# Patient Record
Sex: FEMALE | Race: BLACK OR AFRICAN AMERICAN | NOT HISPANIC OR LATINO | Employment: OTHER | URBAN - METROPOLITAN AREA
[De-identification: names, ages, dates, MRNs, and addresses within clinical notes are randomized per-mention and may not be internally consistent; named-entity substitution may affect disease eponyms.]

---

## 2017-11-16 ENCOUNTER — HISTORICAL (OUTPATIENT)
Dept: RADIOLOGY | Facility: HOSPITAL | Age: 62
End: 2017-11-16

## 2017-12-11 ENCOUNTER — HISTORICAL (OUTPATIENT)
Dept: RADIOLOGY | Facility: HOSPITAL | Age: 62
End: 2017-12-11

## 2019-05-08 ENCOUNTER — HISTORICAL (OUTPATIENT)
Dept: RADIOLOGY | Facility: HOSPITAL | Age: 64
End: 2019-05-08

## 2019-09-05 ENCOUNTER — HISTORICAL (OUTPATIENT)
Dept: RADIOLOGY | Facility: HOSPITAL | Age: 64
End: 2019-09-05

## 2020-05-04 ENCOUNTER — HISTORICAL (OUTPATIENT)
Dept: RADIOLOGY | Facility: HOSPITAL | Age: 65
End: 2020-05-04

## 2020-08-05 ENCOUNTER — HISTORICAL (OUTPATIENT)
Dept: RADIOLOGY | Facility: HOSPITAL | Age: 65
End: 2020-08-05

## 2021-01-08 ENCOUNTER — HISTORICAL (OUTPATIENT)
Dept: RADIOLOGY | Facility: HOSPITAL | Age: 66
End: 2021-01-08

## 2021-12-20 ENCOUNTER — HISTORICAL (OUTPATIENT)
Dept: ADMINISTRATIVE | Facility: HOSPITAL | Age: 66
End: 2021-12-20

## 2021-12-27 ENCOUNTER — HISTORICAL (OUTPATIENT)
Dept: ADMINISTRATIVE | Facility: HOSPITAL | Age: 66
End: 2021-12-27

## 2022-01-07 ENCOUNTER — HISTORICAL (OUTPATIENT)
Dept: CARDIOLOGY | Facility: HOSPITAL | Age: 67
End: 2022-01-07

## 2022-01-10 ENCOUNTER — HISTORICAL (OUTPATIENT)
Dept: ADMINISTRATIVE | Facility: HOSPITAL | Age: 67
End: 2022-01-10

## 2022-01-24 ENCOUNTER — HISTORICAL (OUTPATIENT)
Dept: ADMINISTRATIVE | Facility: HOSPITAL | Age: 67
End: 2022-01-24

## 2022-02-07 ENCOUNTER — HISTORICAL (OUTPATIENT)
Dept: ADMINISTRATIVE | Facility: HOSPITAL | Age: 67
End: 2022-02-07

## 2022-02-07 LAB — CBG: 135 (ref 70–115)

## 2022-02-21 ENCOUNTER — HISTORICAL (OUTPATIENT)
Dept: ADMINISTRATIVE | Facility: HOSPITAL | Age: 67
End: 2022-02-21

## 2022-02-21 LAB — CBG: 150 (ref 70–115)

## 2022-03-07 ENCOUNTER — HISTORICAL (OUTPATIENT)
Dept: ADMINISTRATIVE | Facility: HOSPITAL | Age: 67
End: 2022-03-07

## 2022-03-21 ENCOUNTER — HISTORICAL (OUTPATIENT)
Dept: ADMINISTRATIVE | Facility: HOSPITAL | Age: 67
End: 2022-03-21

## 2022-04-04 ENCOUNTER — HISTORICAL (OUTPATIENT)
Dept: ADMINISTRATIVE | Facility: HOSPITAL | Age: 67
End: 2022-04-04

## 2022-04-07 ENCOUNTER — HISTORICAL (OUTPATIENT)
Dept: ADMINISTRATIVE | Facility: HOSPITAL | Age: 67
End: 2022-04-07
Payer: MEDICAID

## 2022-04-18 ENCOUNTER — HISTORICAL (OUTPATIENT)
Dept: ADMINISTRATIVE | Facility: HOSPITAL | Age: 67
End: 2022-04-18
Payer: MEDICAID

## 2022-04-18 LAB — CBG: 145 (ref 70–115)

## 2022-04-24 VITALS
HEIGHT: 67 IN | BODY MASS INDEX: 26.85 KG/M2 | DIASTOLIC BLOOD PRESSURE: 82 MMHG | SYSTOLIC BLOOD PRESSURE: 119 MMHG | WEIGHT: 171.06 LBS | OXYGEN SATURATION: 96 %

## 2022-05-02 ENCOUNTER — OFFICE VISIT (OUTPATIENT)
Dept: WOUND CARE | Facility: HOSPITAL | Age: 67
End: 2022-05-02
Attending: NURSE PRACTITIONER
Payer: MEDICARE

## 2022-05-02 VITALS
SYSTOLIC BLOOD PRESSURE: 149 MMHG | HEART RATE: 86 BPM | DIASTOLIC BLOOD PRESSURE: 94 MMHG | BODY MASS INDEX: 27.97 KG/M2 | WEIGHT: 174 LBS | TEMPERATURE: 100 F | HEIGHT: 66 IN | RESPIRATION RATE: 18 BRPM

## 2022-05-02 DIAGNOSIS — L91.0 HYPERTROPHIC SCAR OF SKIN: ICD-10-CM

## 2022-05-02 DIAGNOSIS — L90.5 SCAR CONDITION AND FIBROSIS OF SKIN: Primary | ICD-10-CM

## 2022-05-02 DIAGNOSIS — E11.42 DIABETIC POLYNEUROPATHY ASSOCIATED WITH TYPE 2 DIABETES MELLITUS: ICD-10-CM

## 2022-05-02 DIAGNOSIS — I87.331 IDIOPATHIC CHRONIC VENOUS HTN OF RIGHT LEG WITH ULCER AND INFLAMMATION: ICD-10-CM

## 2022-05-02 DIAGNOSIS — L94.2 CALCINOSIS CUTIS: ICD-10-CM

## 2022-05-02 DIAGNOSIS — I10 BENIGN HYPERTENSION: ICD-10-CM

## 2022-05-02 PROCEDURE — 99213 OFFICE O/P EST LOW 20 MIN: CPT

## 2022-05-02 RX ORDER — GLUCOSAM/CHON-MSM1/C/MANG/BOSW 500-416.6
TABLET ORAL
COMMUNITY
Start: 2022-01-03

## 2022-05-02 RX ORDER — LOSARTAN POTASSIUM 25 MG/1
25 TABLET ORAL DAILY
COMMUNITY
Start: 2022-04-27

## 2022-05-02 RX ORDER — OMEPRAZOLE 40 MG/1
CAPSULE, DELAYED RELEASE ORAL
COMMUNITY
Start: 2022-04-28

## 2022-05-02 RX ORDER — ZIPRASIDONE HYDROCHLORIDE 40 MG/1
40 CAPSULE ORAL DAILY
COMMUNITY
Start: 2022-04-27

## 2022-05-02 RX ORDER — ZOLPIDEM TARTRATE 10 MG/1
10 TABLET ORAL NIGHTLY
COMMUNITY
Start: 2022-04-14

## 2022-05-02 RX ORDER — HYDROCHLOROTHIAZIDE 12.5 MG/1
12.5 CAPSULE ORAL DAILY
COMMUNITY
Start: 2021-12-01

## 2022-05-02 RX ORDER — LATANOPROST 50 UG/ML
SOLUTION/ DROPS OPHTHALMIC
COMMUNITY
Start: 2022-04-14

## 2022-05-02 RX ORDER — GLYBURIDE 5 MG/1
5 TABLET ORAL 2 TIMES DAILY
COMMUNITY
Start: 2022-04-28

## 2022-05-02 RX ORDER — METFORMIN HYDROCHLORIDE 500 MG/1
500 TABLET ORAL 2 TIMES DAILY
COMMUNITY
Start: 2022-04-28
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
## 2022-05-02 NOTE — PATIENT INSTRUCTIONS
Wound Care/ Self Care Instructions   Cleanse right leg and wound to right shin with a mild soap and water   Apply mupirocin ointment to wound bed   Cover with a bandaid   Wound care should be done every other day    Call [phone] if you have any questions or concerns

## 2022-05-16 ENCOUNTER — OFFICE VISIT (OUTPATIENT)
Dept: WOUND CARE | Facility: HOSPITAL | Age: 67
End: 2022-05-16
Attending: EMERGENCY MEDICINE
Payer: MEDICARE

## 2022-05-16 VITALS
TEMPERATURE: 98 F | SYSTOLIC BLOOD PRESSURE: 155 MMHG | RESPIRATION RATE: 16 BRPM | DIASTOLIC BLOOD PRESSURE: 95 MMHG | HEIGHT: 66 IN | HEART RATE: 84 BPM | BODY MASS INDEX: 27.97 KG/M2 | WEIGHT: 174 LBS

## 2022-05-16 DIAGNOSIS — L90.5 SCAR CONDITION AND FIBROSIS OF SKIN: ICD-10-CM

## 2022-05-16 DIAGNOSIS — I87.331 IDIOPATHIC CHRONIC VENOUS HTN OF RIGHT LEG WITH ULCER AND INFLAMMATION: Primary | ICD-10-CM

## 2022-05-16 DIAGNOSIS — L94.2 CALCINOSIS CUTIS: ICD-10-CM

## 2022-05-16 DIAGNOSIS — I10 BENIGN HYPERTENSION: ICD-10-CM

## 2022-05-16 DIAGNOSIS — L91.0 HYPERTROPHIC SCAR OF SKIN: ICD-10-CM

## 2022-05-16 DIAGNOSIS — E11.42 DIABETIC POLYNEUROPATHY ASSOCIATED WITH TYPE 2 DIABETES MELLITUS: ICD-10-CM

## 2022-05-16 LAB — POCT GLUCOSE: 67 MG/DL (ref 70–110)

## 2022-05-16 PROCEDURE — 99213 OFFICE O/P EST LOW 20 MIN: CPT | Mod: 27 | Performed by: NURSE PRACTITIONER

## 2022-05-16 PROCEDURE — 99214 OFFICE O/P EST MOD 30 MIN: CPT

## 2022-05-16 PROCEDURE — 99213 OFFICE O/P EST LOW 20 MIN: CPT | Mod: 27
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
## 2022-05-16 NOTE — PATIENT INSTRUCTIONS
Home health and self care DRESSING INSTRUCTIONS:        Wound location: Right Anterior Shin    Cover with bandaid for protection  No F/U required        Call our [facility] wound clinic for questions/concerns a [phone] .

## 2022-06-10 ENCOUNTER — HOSPITAL ENCOUNTER (OUTPATIENT)
Dept: RADIOLOGY | Facility: HOSPITAL | Age: 67
Discharge: HOME | End: 2022-06-10
Payer: MEDICARE

## 2022-06-10 DIAGNOSIS — M54.51 VERTEBROGENIC LOW BACK PAIN: ICD-10-CM

## 2022-06-10 PROCEDURE — 72100 X-RAY EXAM L-S SPINE 2/3 VWS: CPT | Mod: TC

## 2023-04-29 ENCOUNTER — HOSPITAL ENCOUNTER (EMERGENCY)
Facility: HOSPITAL | Age: 68
Discharge: HOME | End: 2023-04-30
Attending: EMERGENCY MEDICINE
Payer: MEDICARE

## 2023-04-29 VITALS
SYSTOLIC BLOOD PRESSURE: 161 MMHG | OXYGEN SATURATION: 100 % | HEIGHT: 66 IN | DIASTOLIC BLOOD PRESSURE: 90 MMHG | TEMPERATURE: 99 F | BODY MASS INDEX: 28.12 KG/M2 | WEIGHT: 175 LBS | RESPIRATION RATE: 18 BRPM | HEART RATE: 89 BPM

## 2023-04-29 DIAGNOSIS — I10 HYPERTENSION: ICD-10-CM

## 2023-04-29 DIAGNOSIS — I10 HYPERTENSION, UNSPECIFIED TYPE: Primary | ICD-10-CM

## 2023-04-29 LAB
ALBUMIN SERPL-MCNC: 4.2 G/DL (ref 3.4–4.8)
ALBUMIN/GLOB SERPL: 1 RATIO (ref 1.1–2)
ALP SERPL-CCNC: 116 UNIT/L (ref 40–150)
ALT SERPL-CCNC: 41 UNIT/L (ref 0–55)
AST SERPL-CCNC: 31 UNIT/L (ref 5–34)
BASOPHILS # BLD AUTO: 0.11 X10(3)/MCL (ref 0–0.2)
BASOPHILS NFR BLD AUTO: 1.2 %
BILIRUBIN DIRECT+TOT PNL SERPL-MCNC: 0.3 MG/DL
BUN SERPL-MCNC: 7.7 MG/DL (ref 9.8–20.1)
CALCIUM SERPL-MCNC: 10.4 MG/DL (ref 8.4–10.2)
CHLORIDE SERPL-SCNC: 105 MMOL/L (ref 98–107)
CO2 SERPL-SCNC: 23 MMOL/L (ref 23–31)
CREAT SERPL-MCNC: 0.73 MG/DL (ref 0.55–1.02)
EOSINOPHIL # BLD AUTO: 0.13 X10(3)/MCL (ref 0–0.9)
EOSINOPHIL NFR BLD AUTO: 1.4 %
ERYTHROCYTE [DISTWIDTH] IN BLOOD BY AUTOMATED COUNT: 12.7 % (ref 11.5–17)
GFR SERPLBLD CREATININE-BSD FMLA CKD-EPI: >60 MLS/MIN/1.73/M2
GLOBULIN SER-MCNC: 4.2 GM/DL (ref 2.4–3.5)
GLUCOSE SERPL-MCNC: 156 MG/DL (ref 82–115)
HCT VFR BLD AUTO: 45.4 % (ref 37–47)
HGB BLD-MCNC: 14.6 G/DL (ref 12–16)
IMM GRANULOCYTES # BLD AUTO: 0.02 X10(3)/MCL (ref 0–0.04)
IMM GRANULOCYTES NFR BLD AUTO: 0.2 %
LYMPHOCYTES # BLD AUTO: 4.86 X10(3)/MCL (ref 0.6–4.6)
LYMPHOCYTES NFR BLD AUTO: 53.8 %
MCH RBC QN AUTO: 27.3 PG (ref 27–31)
MCHC RBC AUTO-ENTMCNC: 32.2 G/DL (ref 33–36)
MCV RBC AUTO: 85 FL (ref 80–94)
MONOCYTES # BLD AUTO: 0.56 X10(3)/MCL (ref 0.1–1.3)
MONOCYTES NFR BLD AUTO: 6.2 %
NEUTROPHILS # BLD AUTO: 3.36 X10(3)/MCL (ref 2.1–9.2)
NEUTROPHILS NFR BLD AUTO: 37.2 %
NRBC BLD AUTO-RTO: 0 %
PLATELET # BLD AUTO: 264 X10(3)/MCL (ref 130–400)
PMV BLD AUTO: 10.8 FL (ref 7.4–10.4)
POTASSIUM SERPL-SCNC: 3.7 MMOL/L (ref 3.5–5.1)
PROT SERPL-MCNC: 8.4 GM/DL (ref 5.8–7.6)
RBC # BLD AUTO: 5.34 X10(6)/MCL (ref 4.2–5.4)
SODIUM SERPL-SCNC: 140 MMOL/L (ref 136–145)
TROPONIN I SERPL-MCNC: <0.01 NG/ML (ref 0–0.04)
WBC # SPEC AUTO: 9 X10(3)/MCL (ref 4.5–11.5)

## 2023-04-29 PROCEDURE — 84484 ASSAY OF TROPONIN QUANT: CPT | Performed by: NURSE PRACTITIONER

## 2023-04-29 PROCEDURE — 93005 ELECTROCARDIOGRAM TRACING: CPT

## 2023-04-29 PROCEDURE — 93010 ELECTROCARDIOGRAM REPORT: CPT | Mod: ,,, | Performed by: INTERNAL MEDICINE

## 2023-04-29 PROCEDURE — 99284 EMERGENCY DEPT VISIT MOD MDM: CPT

## 2023-04-29 PROCEDURE — 93010 EKG 12-LEAD: ICD-10-PCS | Mod: ,,, | Performed by: INTERNAL MEDICINE

## 2023-04-29 PROCEDURE — 85025 COMPLETE CBC W/AUTO DIFF WBC: CPT | Performed by: NURSE PRACTITIONER

## 2023-04-29 PROCEDURE — 80053 COMPREHEN METABOLIC PANEL: CPT | Performed by: NURSE PRACTITIONER

## 2023-04-29 RX ORDER — HYDRALAZINE HYDROCHLORIDE 25 MG/1
25 TABLET, FILM COATED ORAL 2 TIMES DAILY
COMMUNITY
Start: 2023-04-24 | End: 2024-03-14 | Stop reason: CLARIF

## 2023-04-30 NOTE — ED NOTES
Pt to er with c/o hypertension for a few days and right shoulder pain. Pt recently started on hydralazine by provider. Denies cp, sob or difficulty breathing. Alert and oriented. Denies trauma or injury to right shoulder. Rom intact with steady gait. Pt on cm. Awaiting labs. Spouse at side.

## 2023-04-30 NOTE — FIRST PROVIDER EVALUATION
Medical screening examination initiated.  I have conducted a focused provider triage encounter, findings are as follows:    Brief history of present illness:  Patient states that her BP has been elevated. States that she has been taking her BP medications. Denies any chest pain, SOB, dizziness, headache, or blurred vision.     There were no vitals filed for this visit.    Pertinent physical exam:  Awake, alert, ambulatory      Brief workup plan:  Labs    Preliminary workup initiated; this workup will be continued and followed by the physician or advanced practice provider that is assigned to the patient when roomed.

## 2023-04-30 NOTE — ED PROVIDER NOTES
Encounter Date: 4/29/2023       History     Chief Complaint   Patient presents with    Hypertension     Pt arrives c/o elevated BP at home - 183/119, second reading 200's SBP. Takes losartan, HCTZ, hydralazine at home. + R arm pain, R neck pain. Denies headache, blurred vision, dizziness, CP, SOB, weakness. PCP Dr. Juan Daniel Chung.      68 Patient presents with Hypertension: Pt arrives c/o elevated BP at home - 183/119, second reading 200's SBP. Takes losartan, HCTZ, hydralazine at home.   + R arm pain, R neck pain. Denies headache, blurred vision, dizziness, CP, SOB, weakness. PCP Dr. Juan Daniel Chung.  Patient took her hydralazine 25 mg prior to coming here to the ED.         Hypertension   This is a chronic problem. The problem has been waxing and waning. Pertinent negatives include no chest pain, no anxiety, no blurred vision, no headaches, no neck pain, no dizziness and no shortness of breath. There are no associated agents to hypertension. Risk factors include diabetes mellitus.   Review of patient's allergies indicates:   Allergen Reactions    Aspirin Hives     Past Medical History:   Diagnosis Date    Calcinosis cutis     DM (diabetes mellitus)     HTN (hypertension)      Past Surgical History:   Procedure Laterality Date    TUBAL LIGATION N/A      Family History   Problem Relation Age of Onset    Diabetes Mother     Hypertension Mother      Social History     Tobacco Use    Smoking status: Never    Smokeless tobacco: Never   Substance Use Topics    Alcohol use: Never     Review of Systems   Constitutional:  Negative for fever.   HENT:  Negative for sore throat.    Eyes:  Negative for blurred vision.   Respiratory:  Negative for shortness of breath.    Cardiovascular:  Negative for chest pain.   Gastrointestinal:  Negative for nausea.   Genitourinary:  Negative for dysuria.   Musculoskeletal:  Negative for back pain and neck pain.   Skin:  Negative for rash.   Neurological:  Negative for dizziness, weakness and  headaches.   Hematological:  Does not bruise/bleed easily.     Physical Exam     Initial Vitals [04/29/23 1955]   BP Pulse Resp Temp SpO2   (!) 185/91 98 18 99.3 °F (37.4 °C) 97 %      MAP       --         Physical Exam    Constitutional: She appears well-developed and well-nourished.   Cardiovascular:  Normal rate.           Pulmonary/Chest: Breath sounds normal. No respiratory distress. She has no wheezes. She has no rales.   Abdominal: Abdomen is soft.   Musculoskeletal:         General: Normal range of motion.     Neurological: She is alert and oriented to person, place, and time. She has normal strength.   Psychiatric: She has a normal mood and affect. Her behavior is normal. Judgment and thought content normal.       ED Course   Procedures  Labs Reviewed   COMPREHENSIVE METABOLIC PANEL - Abnormal; Notable for the following components:       Result Value    Glucose Level 156 (*)     Blood Urea Nitrogen 7.7 (*)     Calcium Level Total 10.4 (*)     Protein Total 8.4 (*)     Globulin 4.2 (*)     Albumin/Globulin Ratio 1.0 (*)     All other components within normal limits   CBC WITH DIFFERENTIAL - Abnormal; Notable for the following components:    MCHC 32.2 (*)     MPV 10.8 (*)     Lymph # 4.86 (*)     All other components within normal limits   TROPONIN I - Normal   CBC W/ AUTO DIFFERENTIAL    Narrative:     The following orders were created for panel order CBC Auto Differential.  Procedure                               Abnormality         Status                     ---------                               -----------         ------                     CBC with Differential[[id number]]        Abnormal            Final result                 Please view results for these tests on the individual orders.     EKG Readings: (Independently Interpreted)   Rhythm: Sinus Tachycardia. Heart Rate: 102. Conduction: Normal. ST Segments: Normal ST Segments. T Waves: Normal. Axis: Left Axis Deviation.     Imaging Results    None           Medications - No data to display  Medical Decision Making:   Initial Assessment:   68 Patient presents with Hypertension: Pt arrives c/o elevated BP at home - 183/119, second reading 200's SBP. Takes losartan, HCTZ, hydralazine at home.   + R arm pain, R neck pain. Denies headache, blurred vision, dizziness, CP, SOB, weakness. PCP Dr. Juan Daniel Chung.  Patient took her hydralazine 25 mg prior to coming here to the ED  Differential Diagnosis:   Judging by the patient's chief complaint and pertinent history, the patient has the following possible differential diagnoses, including but not limited to the following.  Some of these are deemed to be lower likelihood and some more likely based on my physical exam and history combined with possible lab work and/or imaging studies.   Please see the pertinent studies, and refer to the HPI.  Some of these diagnoses will take further evaluation to fully rule out, perhaps as an outpatient and the patient was encouraged to follow up when discharged for more comprehensive evaluation.    A, dehydration, medication induced, anxiety, panic disorder, caffeine/energy supplement side effect       Clinical Tests:   Lab Tests: Reviewed  The following lab test(s) were unremarkable: CBC, CMP and Troponin  ED Management:  CBC CMP troponin within normal range.  Stable pressure asymptomatic.  The patient is resting comfortably in no acute distress.  She is hemodynamically stable and is without objective evidence for acute process requiring urgent intervention or hospitalization. I provided counseling to patient with regard to condition, the treatment plan, specific conditions for return, and the importance of follow up. Detailed written and verbal instructions provided to patient and he expressed a verbal understanding of the discharge instructions and overall management plan. Reiterated the importance of medication administration and safety and advised patient to follow up with primary  care provider in 3-5 days or sooner if needed.  Answered questions at this time. The patient is stable for discharge.                 ED Course as of 04/30/23 0000   Sat Apr 29, 2023   8637 Patient feels better, she is currently not having any chest pain.  Asymptomatic at point. [YG]      ED Course User Index  [YG] ALEAH Szymanski                 Clinical Impression:   Final diagnoses:  [I10] Hypertension  [I10] Hypertension, unspecified type (Primary)        ED Disposition Condition    Discharge Stable          ED Prescriptions    None       Follow-up Information       Follow up With Specialties Details Why Contact Info    [facility] - Emergency Dept Emergency Medicine  If symptoms worsen [address] [facility] [id number] [phone]    Juan Daniel Chung MD Internal Medicine  If symptoms worsen [facility] [facility] [address]  [phone]               ALEAH Szymanski  04/30/23 0000

## 2023-07-12 ENCOUNTER — LAB VISIT (OUTPATIENT)
Dept: LAB | Facility: HOSPITAL | Age: 68
End: 2023-07-12
Attending: INTERNAL MEDICINE
Payer: MEDICARE

## 2023-07-12 DIAGNOSIS — K21.9 GASTROESOPHAGEAL REFLUX DISEASE, UNSPECIFIED WHETHER ESOPHAGITIS PRESENT: ICD-10-CM

## 2023-07-12 DIAGNOSIS — I10 ESSENTIAL HYPERTENSION, MALIGNANT: ICD-10-CM

## 2023-07-12 DIAGNOSIS — R53.81 MALAISE AND FATIGUE: ICD-10-CM

## 2023-07-12 DIAGNOSIS — R00.2 PALPITATIONS: ICD-10-CM

## 2023-07-12 DIAGNOSIS — R53.83 MALAISE AND FATIGUE: ICD-10-CM

## 2023-07-12 DIAGNOSIS — I20.89 ATYPICAL ANGINA: Primary | ICD-10-CM

## 2023-07-12 DIAGNOSIS — R94.39 ABNORMAL BALLISTOCARDIOGRAM: ICD-10-CM

## 2023-07-12 DIAGNOSIS — R06.09 DOE (DYSPNEA ON EXERTION): ICD-10-CM

## 2023-07-12 LAB
ALBUMIN SERPL-MCNC: 3.9 G/DL (ref 3.4–4.8)
ALBUMIN/GLOB SERPL: 1.1 RATIO (ref 1.1–2)
ALP SERPL-CCNC: 86 UNIT/L (ref 40–150)
ALT SERPL-CCNC: 34 UNIT/L (ref 0–55)
AST SERPL-CCNC: 23 UNIT/L (ref 5–34)
BASOPHILS # BLD AUTO: 0.09 X10(3)/MCL
BASOPHILS NFR BLD AUTO: 1.4 %
BILIRUBIN DIRECT+TOT PNL SERPL-MCNC: 0.3 MG/DL
BUN SERPL-MCNC: 9.4 MG/DL (ref 9.8–20.1)
CALCIUM SERPL-MCNC: 9.4 MG/DL (ref 8.4–10.2)
CHLORIDE SERPL-SCNC: 107 MMOL/L (ref 98–107)
CO2 SERPL-SCNC: 22 MMOL/L (ref 23–31)
CREAT SERPL-MCNC: 0.78 MG/DL (ref 0.55–1.02)
EOSINOPHIL # BLD AUTO: 0.1 X10(3)/MCL (ref 0–0.9)
EOSINOPHIL NFR BLD AUTO: 1.6 %
ERYTHROCYTE [DISTWIDTH] IN BLOOD BY AUTOMATED COUNT: 12.8 % (ref 11.5–17)
GFR SERPLBLD CREATININE-BSD FMLA CKD-EPI: >60 MLS/MIN/1.73/M2
GLOBULIN SER-MCNC: 3.7 GM/DL (ref 2.4–3.5)
GLUCOSE SERPL-MCNC: 191 MG/DL (ref 82–115)
HCT VFR BLD AUTO: 43.4 % (ref 37–47)
HGB BLD-MCNC: 14.1 G/DL (ref 12–16)
IMM GRANULOCYTES # BLD AUTO: 0.02 X10(3)/MCL (ref 0–0.04)
IMM GRANULOCYTES NFR BLD AUTO: 0.3 %
LYMPHOCYTES # BLD AUTO: 3.62 X10(3)/MCL (ref 0.6–4.6)
LYMPHOCYTES NFR BLD AUTO: 56.1 %
MAGNESIUM SERPL-MCNC: 1.9 MG/DL (ref 1.6–2.6)
MCH RBC QN AUTO: 27.6 PG (ref 27–31)
MCHC RBC AUTO-ENTMCNC: 32.5 G/DL (ref 33–36)
MCV RBC AUTO: 85.1 FL (ref 80–94)
MONOCYTES # BLD AUTO: 0.48 X10(3)/MCL (ref 0.1–1.3)
MONOCYTES NFR BLD AUTO: 7.4 %
NEUTROPHILS # BLD AUTO: 2.14 X10(3)/MCL (ref 2.1–9.2)
NEUTROPHILS NFR BLD AUTO: 33.2 %
NRBC BLD AUTO-RTO: 0 %
PLATELET # BLD AUTO: 262 X10(3)/MCL (ref 130–400)
PMV BLD AUTO: 10.6 FL (ref 7.4–10.4)
POTASSIUM SERPL-SCNC: 4.3 MMOL/L (ref 3.5–5.1)
PROT SERPL-MCNC: 7.6 GM/DL (ref 5.8–7.6)
RBC # BLD AUTO: 5.1 X10(6)/MCL (ref 4.2–5.4)
SODIUM SERPL-SCNC: 140 MMOL/L (ref 136–145)
WBC # SPEC AUTO: 6.45 X10(3)/MCL (ref 4.5–11.5)

## 2023-07-12 PROCEDURE — 80053 COMPREHEN METABOLIC PANEL: CPT

## 2023-07-12 PROCEDURE — 36415 COLL VENOUS BLD VENIPUNCTURE: CPT

## 2023-07-12 PROCEDURE — 83735 ASSAY OF MAGNESIUM: CPT

## 2023-07-12 PROCEDURE — 85025 COMPLETE CBC W/AUTO DIFF WBC: CPT

## 2023-08-24 ENCOUNTER — APPOINTMENT (OUTPATIENT)
Dept: LAB | Facility: HOSPITAL | Age: 68
End: 2023-08-24
Attending: INTERNAL MEDICINE
Payer: MEDICARE

## 2023-08-24 DIAGNOSIS — R00.2 PALPITATIONS: ICD-10-CM

## 2023-08-24 DIAGNOSIS — I20.89 ANGINA DECUBITUS: Primary | ICD-10-CM

## 2023-08-24 DIAGNOSIS — R53.81 DEBILITY: ICD-10-CM

## 2023-08-24 DIAGNOSIS — K21.9 GASTROESOPHAGEAL REFLUX DISEASE, UNSPECIFIED WHETHER ESOPHAGITIS PRESENT: ICD-10-CM

## 2023-08-24 DIAGNOSIS — E11.9 DIABETES MELLITUS WITHOUT COMPLICATION: ICD-10-CM

## 2023-08-24 DIAGNOSIS — R06.02 SHORTNESS OF BREATH: ICD-10-CM

## 2023-08-24 DIAGNOSIS — R06.00 DYSPNEA, UNSPECIFIED TYPE: ICD-10-CM

## 2023-08-24 LAB
ALBUMIN SERPL-MCNC: 3.9 G/DL (ref 3.4–4.8)
ALBUMIN/GLOB SERPL: 1.2 RATIO (ref 1.1–2)
ALP SERPL-CCNC: 77 UNIT/L (ref 40–150)
ALT SERPL-CCNC: 39 UNIT/L (ref 0–55)
AST SERPL-CCNC: 33 UNIT/L (ref 5–34)
BASOPHILS # BLD AUTO: 0.09 X10(3)/MCL
BASOPHILS NFR BLD AUTO: 1.4 %
BILIRUB SERPL-MCNC: 0.4 MG/DL
BUN SERPL-MCNC: 9.3 MG/DL (ref 9.8–20.1)
CALCIUM SERPL-MCNC: 9.5 MG/DL (ref 8.4–10.2)
CHLORIDE SERPL-SCNC: 108 MMOL/L (ref 98–107)
CHOLEST SERPL-MCNC: 113 MG/DL
CHOLEST/HDLC SERPL: 3 {RATIO} (ref 0–5)
CO2 SERPL-SCNC: 24 MMOL/L (ref 23–31)
CREAT SERPL-MCNC: 0.75 MG/DL (ref 0.55–1.02)
DEPRECATED CALCIDIOL+CALCIFEROL SERPL-MC: 14.5 NG/ML (ref 30–80)
EOSINOPHIL # BLD AUTO: 0.06 X10(3)/MCL (ref 0–0.9)
EOSINOPHIL NFR BLD AUTO: 0.9 %
ERYTHROCYTE [DISTWIDTH] IN BLOOD BY AUTOMATED COUNT: 13 % (ref 11.5–17)
FT4I SERPL CALC-MCNC: 2.57 (ref 2.6–3.6)
GFR SERPLBLD CREATININE-BSD FMLA CKD-EPI: >60 MLS/MIN/1.73/M2
GLOBULIN SER-MCNC: 3.3 GM/DL (ref 2.4–3.5)
GLUCOSE SERPL-MCNC: 183 MG/DL (ref 82–115)
HCT VFR BLD AUTO: 43 % (ref 37–47)
HDLC SERPL-MCNC: 42 MG/DL (ref 35–60)
HGB BLD-MCNC: 13.6 G/DL (ref 12–16)
IMM GRANULOCYTES # BLD AUTO: 0.02 X10(3)/MCL (ref 0–0.04)
IMM GRANULOCYTES NFR BLD AUTO: 0.3 %
LDLC SERPL CALC-MCNC: 50 MG/DL (ref 50–140)
LYMPHOCYTES # BLD AUTO: 3.59 X10(3)/MCL (ref 0.6–4.6)
LYMPHOCYTES NFR BLD AUTO: 54.6 %
MAGNESIUM SERPL-MCNC: 2 MG/DL (ref 1.6–2.6)
MCH RBC QN AUTO: 27.6 PG (ref 27–31)
MCHC RBC AUTO-ENTMCNC: 31.6 G/DL (ref 33–36)
MCV RBC AUTO: 87.2 FL (ref 80–94)
MONOCYTES # BLD AUTO: 0.46 X10(3)/MCL (ref 0.1–1.3)
MONOCYTES NFR BLD AUTO: 7 %
NEUTROPHILS # BLD AUTO: 2.36 X10(3)/MCL (ref 2.1–9.2)
NEUTROPHILS NFR BLD AUTO: 35.8 %
NRBC BLD AUTO-RTO: 0 %
PLATELET # BLD AUTO: 228 X10(3)/MCL (ref 130–400)
PMV BLD AUTO: 10.8 FL (ref 7.4–10.4)
POTASSIUM SERPL-SCNC: 4 MMOL/L (ref 3.5–5.1)
PROT SERPL-MCNC: 7.2 GM/DL (ref 5.8–7.6)
RBC # BLD AUTO: 4.93 X10(6)/MCL (ref 4.2–5.4)
SODIUM SERPL-SCNC: 139 MMOL/L (ref 136–145)
T3RU NFR SERPL: 21.15 % (ref 31–39)
T4 SERPL-MCNC: 12.17 UG/DL (ref 4.87–11.72)
TRIGL SERPL-MCNC: 105 MG/DL (ref 37–140)
TSH SERPL-ACNC: 0.77 UIU/ML (ref 0.35–4.94)
VLDLC SERPL CALC-MCNC: 21 MG/DL
WBC # SPEC AUTO: 6.58 X10(3)/MCL (ref 4.5–11.5)

## 2023-08-24 PROCEDURE — 84436 ASSAY OF TOTAL THYROXINE: CPT

## 2023-08-24 PROCEDURE — 82306 VITAMIN D 25 HYDROXY: CPT | Mod: GA

## 2023-08-24 PROCEDURE — 84443 ASSAY THYROID STIM HORMONE: CPT

## 2023-08-24 PROCEDURE — 80053 COMPREHEN METABOLIC PANEL: CPT

## 2023-08-24 PROCEDURE — 80061 LIPID PANEL: CPT

## 2023-08-24 PROCEDURE — 83735 ASSAY OF MAGNESIUM: CPT

## 2023-08-24 PROCEDURE — 36415 COLL VENOUS BLD VENIPUNCTURE: CPT

## 2023-08-24 PROCEDURE — 85025 COMPLETE CBC W/AUTO DIFF WBC: CPT

## 2024-02-22 ENCOUNTER — APPOINTMENT (OUTPATIENT)
Dept: LAB | Facility: HOSPITAL | Age: 69
End: 2024-02-22
Attending: INTERNAL MEDICINE
Payer: MEDICARE

## 2024-02-22 DIAGNOSIS — I10 ESSENTIAL HYPERTENSION, MALIGNANT: Primary | ICD-10-CM

## 2024-02-22 DIAGNOSIS — E55.9 AVITAMINOSIS D: ICD-10-CM

## 2024-02-22 DIAGNOSIS — E78.00 PURE HYPERCHOLESTEROLEMIA: ICD-10-CM

## 2024-02-22 DIAGNOSIS — E11.9 DIABETES MELLITUS WITHOUT COMPLICATION: ICD-10-CM

## 2024-02-22 LAB
ALBUMIN SERPL-MCNC: 3.9 G/DL (ref 3.4–4.8)
ALBUMIN/GLOB SERPL: 1 RATIO (ref 1.1–2)
ALP SERPL-CCNC: 87 UNIT/L (ref 40–150)
ALT SERPL-CCNC: 34 UNIT/L (ref 0–55)
AST SERPL-CCNC: 30 UNIT/L (ref 5–34)
BILIRUB SERPL-MCNC: 0.5 MG/DL
BUN SERPL-MCNC: 9 MG/DL (ref 9.8–20.1)
CALCIUM SERPL-MCNC: 9.7 MG/DL (ref 8.4–10.2)
CHLORIDE SERPL-SCNC: 108 MMOL/L (ref 98–107)
CHOLEST SERPL-MCNC: 119 MG/DL
CHOLEST/HDLC SERPL: 3 {RATIO} (ref 0–5)
CO2 SERPL-SCNC: 26 MMOL/L (ref 23–31)
CREAT SERPL-MCNC: 0.78 MG/DL (ref 0.55–1.02)
DEPRECATED CALCIDIOL+CALCIFEROL SERPL-MC: 8.2 NG/ML (ref 30–80)
ERYTHROCYTE [DISTWIDTH] IN BLOOD BY AUTOMATED COUNT: 12.9 % (ref 11.5–17)
EST. AVERAGE GLUCOSE BLD GHB EST-MCNC: 208.7 MG/DL
GFR SERPLBLD CREATININE-BSD FMLA CKD-EPI: >60 MLS/MIN/1.73/M2
GLOBULIN SER-MCNC: 3.9 GM/DL (ref 2.4–3.5)
GLUCOSE SERPL-MCNC: 174 MG/DL (ref 82–115)
HBA1C MFR BLD: 8.9 %
HCT VFR BLD AUTO: 47.7 % (ref 37–47)
HDLC SERPL-MCNC: 36 MG/DL (ref 35–60)
HGB BLD-MCNC: 14.2 G/DL (ref 12–16)
LDLC SERPL CALC-MCNC: 48 MG/DL (ref 50–140)
MCH RBC QN AUTO: 27.2 PG (ref 27–31)
MCHC RBC AUTO-ENTMCNC: 29.8 G/DL (ref 33–36)
MCV RBC AUTO: 91.4 FL (ref 80–94)
NRBC BLD AUTO-RTO: 0 %
PLATELET # BLD AUTO: 232 X10(3)/MCL (ref 130–400)
PMV BLD AUTO: 10.6 FL (ref 7.4–10.4)
POTASSIUM SERPL-SCNC: 4.5 MMOL/L (ref 3.5–5.1)
PROT SERPL-MCNC: 7.8 GM/DL (ref 5.8–7.6)
RBC # BLD AUTO: 5.22 X10(6)/MCL (ref 4.2–5.4)
SODIUM SERPL-SCNC: 142 MMOL/L (ref 136–145)
T4 FREE SERPL-MCNC: 0.78 NG/DL (ref 0.7–1.48)
TRIGL SERPL-MCNC: 173 MG/DL (ref 37–140)
TSH SERPL-ACNC: 0.69 UIU/ML (ref 0.35–4.94)
VLDLC SERPL CALC-MCNC: 35 MG/DL
WBC # SPEC AUTO: 6.81 X10(3)/MCL (ref 4.5–11.5)

## 2024-02-22 PROCEDURE — 84443 ASSAY THYROID STIM HORMONE: CPT

## 2024-02-22 PROCEDURE — 80061 LIPID PANEL: CPT

## 2024-02-22 PROCEDURE — 82306 VITAMIN D 25 HYDROXY: CPT

## 2024-02-22 PROCEDURE — 85027 COMPLETE CBC AUTOMATED: CPT

## 2024-02-22 PROCEDURE — 83036 HEMOGLOBIN GLYCOSYLATED A1C: CPT

## 2024-02-22 PROCEDURE — 36415 COLL VENOUS BLD VENIPUNCTURE: CPT

## 2024-02-22 PROCEDURE — 80053 COMPREHEN METABOLIC PANEL: CPT

## 2024-02-22 PROCEDURE — 84439 ASSAY OF FREE THYROXINE: CPT

## 2024-03-14 ENCOUNTER — HOSPITAL ENCOUNTER (OUTPATIENT)
Dept: WOUND CARE | Facility: HOSPITAL | Age: 69
Discharge: HOME | End: 2024-03-14
Attending: EMERGENCY MEDICINE | Admitting: EMERGENCY MEDICINE
Payer: MEDICARE

## 2024-03-14 VITALS
HEIGHT: 66 IN | DIASTOLIC BLOOD PRESSURE: 90 MMHG | BODY MASS INDEX: 27.32 KG/M2 | HEART RATE: 77 BPM | SYSTOLIC BLOOD PRESSURE: 167 MMHG | WEIGHT: 170 LBS | RESPIRATION RATE: 20 BRPM | TEMPERATURE: 98 F

## 2024-03-14 DIAGNOSIS — F31.9 BIPOLAR AFFECTIVE DISORDER, REMISSION STATUS UNSPECIFIED: ICD-10-CM

## 2024-03-14 DIAGNOSIS — E11.42 DIABETIC POLYNEUROPATHY ASSOCIATED WITH TYPE 2 DIABETES MELLITUS: ICD-10-CM

## 2024-03-14 DIAGNOSIS — I10 BENIGN HYPERTENSION: ICD-10-CM

## 2024-03-14 DIAGNOSIS — S81.801A OPEN WOUND OF RIGHT LOWER LEG, INITIAL ENCOUNTER: ICD-10-CM

## 2024-03-14 DIAGNOSIS — L94.2 CALCINOSIS CUTIS: ICD-10-CM

## 2024-03-14 DIAGNOSIS — I87.331 IDIOPATHIC CHRONIC VENOUS HTN OF RIGHT LEG WITH ULCER AND INFLAMMATION: ICD-10-CM

## 2024-03-14 DIAGNOSIS — E78.5 HYPERLIPIDEMIA, UNSPECIFIED HYPERLIPIDEMIA TYPE: ICD-10-CM

## 2024-03-14 DIAGNOSIS — L91.0 HYPERTROPHIC SCAR OF SKIN: ICD-10-CM

## 2024-03-14 DIAGNOSIS — L90.5 SCAR CONDITION AND FIBROSIS OF SKIN: ICD-10-CM

## 2024-03-14 LAB — POCT GLUCOSE: 174 MG/DL (ref 70–110)

## 2024-03-14 PROCEDURE — 99215 OFFICE O/P EST HI 40 MIN: CPT

## 2024-03-14 PROCEDURE — 99215 OFFICE O/P EST HI 40 MIN: CPT | Mod: ,,, | Performed by: EMERGENCY MEDICINE

## 2024-03-14 PROCEDURE — 27000999 HC MEDICAL RECORD PHOTO DOCUMENTATION

## 2024-03-14 RX ORDER — NEBIVOLOL 10 MG/1
10 TABLET ORAL
COMMUNITY
Start: 2024-02-16

## 2024-03-14 RX ORDER — ERGOCALCIFEROL 1.25 1/1
50000 CAPSULE ORAL
COMMUNITY
Start: 2024-02-27

## 2024-03-14 NOTE — PATIENT INSTRUCTIONS
Pt seen today by: Dr. Thais Wylie      Home health and self care DRESSING INSTRUCTIONS:        Wound location: right lower leg    Cleanse wound with wound cleanser or saline or baby shampoo and water  Apply calcium alginate to the wound bed  Cover with bandaid   Change dressing daily     Compression with: N/A    Return visit: Thursday, March 21, 2024 at 10:00 am      Wound may have been debrided in clinic: if so, WHAT YOU NEED TO KNOW:    Debridement is the removal of infected, damaged, or dead tissue so a wound can heal properly. Your wound may need more than one debridement. Debridement can cause bleeding, and a small amount of blood is expected.  AFTER A DEBRIDEMENT:    Keep your wound clean and dry. Do not remove the dressing unless instructed.  Follow the wound care orders provided to you or your home health care provider.  If you have pain, take over the counter pain relievers or pain medication if prescribed.  Elevate the wound and limit excessive activity to prevent bleeding and/or swelling in your wound.  If you see blood coming through the dressing, apply gauze and tape over the dressing and hold firm pressure to the wound with your hand for 5-10 minutes continuously, without peeking, to help the bleeding stop.  Contact [facility] wound care team at [phone] or go to the nearest Emergency department if:    You have a fever greater than 101 taken by mouth.  Your pain gets worse or does not go away, even after taking your regular pain medicine.  Your skin around your wound is red, hot, swollen, or draining pus.  You have bleeding that continues to come through the dressing after holding pressure for 10 minutes       Compression: You may be using a compressive type of dressings to control edema: If so, keep your compression wrap or tubigrip in place. Do not get them wet, they should feel snug. If they feel tight, or cause pain in any way,  elevate your legs above your heart for 15 minutes. If the wraps  still cause pain or you can not tolerate compression,  please remove and notify the clinic or your home health.     Nutrition:  The current daily value (%DV) for protein is 50 grams per day and is meant as a general goal for most people. Further increasing your dietary protein intake is very important for wound healing. Typically one needs over 100g of protein per day to help with wound healing needs.  If you are a dialysis patient or have problems with your kidneys, talk to your Nephrologist about how much protein you can take in with your condition.  Examples of high protein items that can be added to your diet include: eggs, chicken, red meats, almonds, cottage cheese, Greek yogurt, beans, and peanut butter.  Fortified protein bars, shakes and drinks can add 15-30 additional grams of protein per serving.   Also add:   1 daily general multivitamin   Jomar : 1 packet twice daily   Vitamin C : 500mg twice daily   Zinc 220 mg daily  Vit D : once daily    Offloading   Offload your wound. This means to reduce pressure on and around the wound that reduces blood flow to the wound and prevents healing. Your wound care team will discuss specific ways for you to offload your specific wound. Common offloading strategies include:    Turn or reposition every 2 hours or sooner  Use pillows, wedges, ROHO wheelchair cushions or other special devices like boots and shoes to lift the wound off of hard surfaces  Alternating Low Air-loss (ALAL) mattress may be ordered  Padded dressings can reduce wound pressure        Call our [facility] wound clinic for questions/concerns a [phone]
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
## 2024-03-21 ENCOUNTER — HOSPITAL ENCOUNTER (OUTPATIENT)
Dept: WOUND CARE | Facility: HOSPITAL | Age: 69
Discharge: HOME | End: 2024-03-21
Attending: EMERGENCY MEDICINE
Payer: MEDICARE

## 2024-03-21 VITALS
RESPIRATION RATE: 18 BRPM | BODY MASS INDEX: 27.32 KG/M2 | DIASTOLIC BLOOD PRESSURE: 84 MMHG | HEIGHT: 66 IN | HEART RATE: 89 BPM | SYSTOLIC BLOOD PRESSURE: 157 MMHG | WEIGHT: 170 LBS | TEMPERATURE: 98 F

## 2024-03-21 DIAGNOSIS — E78.5 HYPERLIPIDEMIA, UNSPECIFIED HYPERLIPIDEMIA TYPE: ICD-10-CM

## 2024-03-21 DIAGNOSIS — F31.9 BIPOLAR AFFECTIVE DISORDER, REMISSION STATUS UNSPECIFIED: ICD-10-CM

## 2024-03-21 DIAGNOSIS — L94.2 CALCINOSIS CUTIS: ICD-10-CM

## 2024-03-21 DIAGNOSIS — L91.0 HYPERTROPHIC SCAR OF SKIN: ICD-10-CM

## 2024-03-21 DIAGNOSIS — E11.42 DIABETIC POLYNEUROPATHY ASSOCIATED WITH TYPE 2 DIABETES MELLITUS: ICD-10-CM

## 2024-03-21 DIAGNOSIS — L90.5 SCAR CONDITION AND FIBROSIS OF SKIN: ICD-10-CM

## 2024-03-21 DIAGNOSIS — I10 BENIGN HYPERTENSION: ICD-10-CM

## 2024-03-21 DIAGNOSIS — I87.331 IDIOPATHIC CHRONIC VENOUS HTN OF RIGHT LEG WITH ULCER AND INFLAMMATION: ICD-10-CM

## 2024-03-21 DIAGNOSIS — S81.801A OPEN WOUND OF RIGHT LOWER LEG, INITIAL ENCOUNTER: Primary | ICD-10-CM

## 2024-03-21 LAB — POCT GLUCOSE: 231 MG/DL (ref 70–110)

## 2024-03-21 PROCEDURE — 27000999 HC MEDICAL RECORD PHOTO DOCUMENTATION

## 2024-03-21 PROCEDURE — 99214 OFFICE O/P EST MOD 30 MIN: CPT

## 2024-03-21 PROCEDURE — 99214 OFFICE O/P EST MOD 30 MIN: CPT | Mod: ,,, | Performed by: EMERGENCY MEDICINE

## 2024-03-21 RX ORDER — SULFAMETHOXAZOLE AND TRIMETHOPRIM 400; 80 MG/1; MG/1
1 TABLET ORAL EVERY 12 HOURS
Qty: 14 TABLET | Refills: 0 | Status: SHIPPED | OUTPATIENT
Start: 2024-03-21 | End: 2024-03-28

## 2024-03-21 NOTE — PATIENT INSTRUCTIONS
Pt seen today by: Dr. Thais Wylie      We will call you in some antibiotics (for 14 days) into [facility], pick them up and began taking      Home health and self care DRESSING INSTRUCTIONS:        Wound location: right lower leg    Cleanse wound with wound cleanser or saline or baby shampoo and water  Cover both wounds with calcium alginate   Cover with bandaid   Change dressing daily     Compression with: N/A    Return visit: Thursday, March 28, 2024 at 10:00 am      Wound may have been debrided in clinic: if so, WHAT YOU NEED TO KNOW:    Debridement is the removal of infected, damaged, or dead tissue so a wound can heal properly. Your wound may need more than one debridement. Debridement can cause bleeding, and a small amount of blood is expected.  AFTER A DEBRIDEMENT:    Keep your wound clean and dry. Do not remove the dressing unless instructed.  Follow the wound care orders provided to you or your home health care provider.  If you have pain, take over the counter pain relievers or pain medication if prescribed.  Elevate the wound and limit excessive activity to prevent bleeding and/or swelling in your wound.  If you see blood coming through the dressing, apply gauze and tape over the dressing and hold firm pressure to the wound with your hand for 5-10 minutes continuously, without peeking, to help the bleeding stop.  Contact [facility] wound care team at [phone] or go to the nearest Emergency department if:    You have a fever greater than 101 taken by mouth.  Your pain gets worse or does not go away, even after taking your regular pain medicine.  Your skin around your wound is red, hot, swollen, or draining pus.  You have bleeding that continues to come through the dressing after holding pressure for 10 minutes       Compression: You may be using a compressive type of dressings to control edema: If so, keep your compression wrap or tubigrip in place. Do not get them wet, they should feel snug. If  they feel tight, or cause pain in any way,  elevate your legs above your heart for 15 minutes. If the wraps still cause pain or you can not tolerate compression,  please remove and notify the clinic or your home health.     Nutrition:  The current daily value (%DV) for protein is 50 grams per day and is meant as a general goal for most people. Further increasing your dietary protein intake is very important for wound healing. Typically one needs over 100g of protein per day to help with wound healing needs.  If you are a dialysis patient or have problems with your kidneys, talk to your Nephrologist about how much protein you can take in with your condition.  Examples of high protein items that can be added to your diet include: eggs, chicken, red meats, almonds, cottage cheese, Greek yogurt, beans, and peanut butter.  Fortified protein bars, shakes and drinks can add 15-30 additional grams of protein per serving.   Also add:   1 daily general multivitamin   Jomar : 1 packet twice daily   Vitamin C : 500mg twice daily   Zinc 220 mg daily  Vit D : once daily    Offloading   Offload your wound. This means to reduce pressure on and around the wound that reduces blood flow to the wound and prevents healing. Your wound care team will discuss specific ways for you to offload your specific wound. Common offloading strategies include:    Turn or reposition every 2 hours or sooner  Use pillows, wedges, ROHO wheelchair cushions or other special devices like boots and shoes to lift the wound off of hard surfaces  Alternating Low Air-loss (ALAL) mattress may be ordered  Padded dressings can reduce wound pressure        Call our [facility] wound clinic for questions/concerns a [phone]

## 2024-03-28 ENCOUNTER — HOSPITAL ENCOUNTER (OUTPATIENT)
Dept: WOUND CARE | Facility: HOSPITAL | Age: 69
Discharge: HOME | End: 2024-03-28
Attending: EMERGENCY MEDICINE
Payer: MEDICARE

## 2024-03-28 VITALS
RESPIRATION RATE: 18 BRPM | WEIGHT: 170 LBS | HEART RATE: 71 BPM | TEMPERATURE: 98 F | HEIGHT: 66 IN | SYSTOLIC BLOOD PRESSURE: 119 MMHG | BODY MASS INDEX: 27.32 KG/M2 | DIASTOLIC BLOOD PRESSURE: 74 MMHG

## 2024-03-28 DIAGNOSIS — E11.42 DIABETIC POLYNEUROPATHY ASSOCIATED WITH TYPE 2 DIABETES MELLITUS: ICD-10-CM

## 2024-03-28 DIAGNOSIS — F31.9 BIPOLAR AFFECTIVE DISORDER, REMISSION STATUS UNSPECIFIED: ICD-10-CM

## 2024-03-28 DIAGNOSIS — I87.331 IDIOPATHIC CHRONIC VENOUS HTN OF RIGHT LEG WITH ULCER AND INFLAMMATION: ICD-10-CM

## 2024-03-28 DIAGNOSIS — L90.5 SCAR CONDITION AND FIBROSIS OF SKIN: ICD-10-CM

## 2024-03-28 DIAGNOSIS — L91.0 HYPERTROPHIC SCAR OF SKIN: ICD-10-CM

## 2024-03-28 DIAGNOSIS — L94.2 CALCINOSIS CUTIS: ICD-10-CM

## 2024-03-28 DIAGNOSIS — I10 BENIGN HYPERTENSION: ICD-10-CM

## 2024-03-28 DIAGNOSIS — S81.801A OPEN WOUND OF RIGHT LOWER LEG, INITIAL ENCOUNTER: Primary | ICD-10-CM

## 2024-03-28 LAB — POCT GLUCOSE: 176 MG/DL (ref 70–110)

## 2024-03-28 PROCEDURE — 99213 OFFICE O/P EST LOW 20 MIN: CPT

## 2024-03-28 PROCEDURE — 99213 OFFICE O/P EST LOW 20 MIN: CPT | Mod: ,,, | Performed by: EMERGENCY MEDICINE

## 2024-03-28 PROCEDURE — 27000999 HC MEDICAL RECORD PHOTO DOCUMENTATION

## 2024-03-28 RX ORDER — MUPIROCIN 20 MG/G
OINTMENT TOPICAL DAILY
Qty: 15 G | Refills: 0 | Status: SHIPPED | OUTPATIENT
Start: 2024-03-28 | End: 2024-04-07

## 2024-03-28 NOTE — PATIENT INSTRUCTIONS
Pt seen today by: Dr. Thais Wylie      We will call you in some ointment into [facility], pick it up and start using      Self care DRESSING INSTRUCTIONS:        Wound location: right lower leg    Cleanse wound with wound cleanser or saline or baby shampoo and water  Apply ointment into both wound beds  Cover with bandaid   Change dressing daily     Compression with: N/A    Return visit: Thursday, April 4, 2024 at 10:00 am      Wound may have been debrided in clinic: if so, WHAT YOU NEED TO KNOW:    Debridement is the removal of infected, damaged, or dead tissue so a wound can heal properly. Your wound may need more than one debridement. Debridement can cause bleeding, and a small amount of blood is expected.  AFTER A DEBRIDEMENT:    Keep your wound clean and dry. Do not remove the dressing unless instructed.  Follow the wound care orders provided to you or your home health care provider.  If you have pain, take over the counter pain relievers or pain medication if prescribed.  Elevate the wound and limit excessive activity to prevent bleeding and/or swelling in your wound.  If you see blood coming through the dressing, apply gauze and tape over the dressing and hold firm pressure to the wound with your hand for 5-10 minutes continuously, without peeking, to help the bleeding stop.  Contact [facility] wound care team at [phone] or go to the nearest Emergency department if:    You have a fever greater than 101 taken by mouth.  Your pain gets worse or does not go away, even after taking your regular pain medicine.  Your skin around your wound is red, hot, swollen, or draining pus.  You have bleeding that continues to come through the dressing after holding pressure for 10 minutes       Compression: You may be using a compressive type of dressings to control edema: If so, keep your compression wrap or tubigrip in place. Do not get them wet, they should feel snug. If they feel tight, or cause pain in any way,   elevate your legs above your heart for 15 minutes. If the wraps still cause pain or you can not tolerate compression,  please remove and notify the clinic or your home health.     Nutrition:  The current daily value (%DV) for protein is 50 grams per day and is meant as a general goal for most people. Further increasing your dietary protein intake is very important for wound healing. Typically one needs over 100g of protein per day to help with wound healing needs.  If you are a dialysis patient or have problems with your kidneys, talk to your Nephrologist about how much protein you can take in with your condition.  Examples of high protein items that can be added to your diet include: eggs, chicken, red meats, almonds, cottage cheese, Greek yogurt, beans, and peanut butter.  Fortified protein bars, shakes and drinks can add 15-30 additional grams of protein per serving.   Also add:   1 daily general multivitamin   Jomar : 1 packet twice daily   Vitamin C : 500mg twice daily   Zinc 220 mg daily  Vit D : once daily    Offloading   Offload your wound. This means to reduce pressure on and around the wound that reduces blood flow to the wound and prevents healing. Your wound care team will discuss specific ways for you to offload your specific wound. Common offloading strategies include:    Turn or reposition every 2 hours or sooner  Use pillows, wedges, ROHO wheelchair cushions or other special devices like boots and shoes to lift the wound off of hard surfaces  Alternating Low Air-loss (ALAL) mattress may be ordered  Padded dressings can reduce wound pressure        Call our [facility] wound clinic for questions/concerns a [phone]
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
## 2024-04-03 ENCOUNTER — HOSPITAL ENCOUNTER (OUTPATIENT)
Dept: RADIOLOGY | Facility: HOSPITAL | Age: 69
Discharge: HOME | End: 2024-04-03
Attending: NURSE PRACTITIONER
Payer: MEDICARE

## 2024-04-03 DIAGNOSIS — Z12.31 ENCOUNTER FOR SCREENING MAMMOGRAM FOR MALIGNANT NEOPLASM OF BREAST: ICD-10-CM

## 2024-04-03 PROCEDURE — 77063 BREAST TOMOSYNTHESIS BI: CPT | Mod: TC

## 2024-04-03 PROCEDURE — 77067 SCR MAMMO BI INCL CAD: CPT | Mod: 26,,, | Performed by: RADIOLOGY

## 2024-04-03 PROCEDURE — 77063 BREAST TOMOSYNTHESIS BI: CPT | Mod: 26,,, | Performed by: RADIOLOGY

## 2024-04-04 ENCOUNTER — HOSPITAL ENCOUNTER (OUTPATIENT)
Dept: WOUND CARE | Facility: HOSPITAL | Age: 69
Discharge: HOME | End: 2024-04-04
Attending: EMERGENCY MEDICINE
Payer: MEDICARE

## 2024-04-04 VITALS
HEIGHT: 66 IN | HEART RATE: 72 BPM | WEIGHT: 170 LBS | SYSTOLIC BLOOD PRESSURE: 159 MMHG | RESPIRATION RATE: 18 BRPM | DIASTOLIC BLOOD PRESSURE: 98 MMHG | TEMPERATURE: 99 F | BODY MASS INDEX: 27.32 KG/M2

## 2024-04-04 DIAGNOSIS — E78.5 HYPERLIPIDEMIA, UNSPECIFIED HYPERLIPIDEMIA TYPE: ICD-10-CM

## 2024-04-04 DIAGNOSIS — S81.801A OPEN WOUND OF RIGHT LOWER LEG, INITIAL ENCOUNTER: Primary | ICD-10-CM

## 2024-04-04 DIAGNOSIS — I10 BENIGN HYPERTENSION: ICD-10-CM

## 2024-04-04 DIAGNOSIS — L90.5 SCAR CONDITION AND FIBROSIS OF SKIN: ICD-10-CM

## 2024-04-04 DIAGNOSIS — L94.2 CALCINOSIS CUTIS: ICD-10-CM

## 2024-04-04 DIAGNOSIS — E11.42 DIABETIC POLYNEUROPATHY ASSOCIATED WITH TYPE 2 DIABETES MELLITUS: ICD-10-CM

## 2024-04-04 DIAGNOSIS — L91.0 HYPERTROPHIC SCAR OF SKIN: ICD-10-CM

## 2024-04-04 DIAGNOSIS — I87.331 IDIOPATHIC CHRONIC VENOUS HTN OF RIGHT LEG WITH ULCER AND INFLAMMATION: ICD-10-CM

## 2024-04-04 DIAGNOSIS — F31.9 BIPOLAR AFFECTIVE DISORDER, REMISSION STATUS UNSPECIFIED: ICD-10-CM

## 2024-04-04 LAB — POCT GLUCOSE: 178 MG/DL (ref 70–110)

## 2024-04-04 PROCEDURE — 99212 OFFICE O/P EST SF 10 MIN: CPT | Mod: ,,, | Performed by: EMERGENCY MEDICINE

## 2024-04-04 PROCEDURE — 99212 OFFICE O/P EST SF 10 MIN: CPT

## 2024-04-04 PROCEDURE — 27000999 HC MEDICAL RECORD PHOTO DOCUMENTATION
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
## 2024-04-04 NOTE — PATIENT INSTRUCTIONS
Pt seen today by: Dr. Thais Wylie        Self care DRESSING INSTRUCTIONS:        Wound location: right lower leg    Cleanse wound with wound cleanser or saline or baby shampoo and water  Apply ointment into both wound beds  Cover with bandaid   Change dressing daily     Compression with: N/A    Return visit: Thursday, April 18, 2024 at 10:00 am      Wound may have been debrided in clinic: if so, WHAT YOU NEED TO KNOW:    Debridement is the removal of infected, damaged, or dead tissue so a wound can heal properly. Your wound may need more than one debridement. Debridement can cause bleeding, and a small amount of blood is expected.  AFTER A DEBRIDEMENT:    Keep your wound clean and dry. Do not remove the dressing unless instructed.  Follow the wound care orders provided to you or your home health care provider.  If you have pain, take over the counter pain relievers or pain medication if prescribed.  Elevate the wound and limit excessive activity to prevent bleeding and/or swelling in your wound.  If you see blood coming through the dressing, apply gauze and tape over the dressing and hold firm pressure to the wound with your hand for 5-10 minutes continuously, without peeking, to help the bleeding stop.  Contact [facility] wound care team at [phone] or go to the nearest Emergency department if:    You have a fever greater than 101 taken by mouth.  Your pain gets worse or does not go away, even after taking your regular pain medicine.  Your skin around your wound is red, hot, swollen, or draining pus.  You have bleeding that continues to come through the dressing after holding pressure for 10 minutes       Compression: You may be using a compressive type of dressings to control edema: If so, keep your compression wrap or tubigrip in place. Do not get them wet, they should feel snug. If they feel tight, or cause pain in any way,  elevate your legs above your heart for 15 minutes. If the wraps still cause pain  or you can not tolerate compression,  please remove and notify the clinic or your home health.     Nutrition:  The current daily value (%DV) for protein is 50 grams per day and is meant as a general goal for most people. Further increasing your dietary protein intake is very important for wound healing. Typically one needs over 100g of protein per day to help with wound healing needs.  If you are a dialysis patient or have problems with your kidneys, talk to your Nephrologist about how much protein you can take in with your condition.  Examples of high protein items that can be added to your diet include: eggs, chicken, red meats, almonds, cottage cheese, Greek yogurt, beans, and peanut butter.  Fortified protein bars, shakes and drinks can add 15-30 additional grams of protein per serving.   Also add:   1 daily general multivitamin   Jomar : 1 packet twice daily   Vitamin C : 500mg twice daily   Zinc 220 mg daily  Vit D : once daily    Offloading   Offload your wound. This means to reduce pressure on and around the wound that reduces blood flow to the wound and prevents healing. Your wound care team will discuss specific ways for you to offload your specific wound. Common offloading strategies include:    Turn or reposition every 2 hours or sooner  Use pillows, wedges, ROHO wheelchair cushions or other special devices like boots and shoes to lift the wound off of hard surfaces  Alternating Low Air-loss (ALAL) mattress may be ordered  Padded dressings can reduce wound pressure        Call our [facility] wound clinic for questions/concerns a [phone]

## 2024-04-18 ENCOUNTER — HOSPITAL ENCOUNTER (OUTPATIENT)
Dept: WOUND CARE | Facility: HOSPITAL | Age: 69
Discharge: HOME | End: 2024-04-18
Attending: EMERGENCY MEDICINE
Payer: MEDICARE

## 2024-04-18 VITALS
BODY MASS INDEX: 27.32 KG/M2 | RESPIRATION RATE: 16 BRPM | SYSTOLIC BLOOD PRESSURE: 149 MMHG | HEART RATE: 69 BPM | TEMPERATURE: 98 F | HEIGHT: 66 IN | DIASTOLIC BLOOD PRESSURE: 76 MMHG | WEIGHT: 170 LBS

## 2024-04-18 DIAGNOSIS — L90.5 SCAR CONDITION AND FIBROSIS OF SKIN: ICD-10-CM

## 2024-04-18 DIAGNOSIS — L94.2 CALCINOSIS CUTIS: ICD-10-CM

## 2024-04-18 DIAGNOSIS — I87.331 IDIOPATHIC CHRONIC VENOUS HTN OF RIGHT LEG WITH ULCER AND INFLAMMATION: ICD-10-CM

## 2024-04-18 DIAGNOSIS — L91.0 HYPERTROPHIC SCAR OF SKIN: ICD-10-CM

## 2024-04-18 DIAGNOSIS — E11.42 DIABETIC POLYNEUROPATHY ASSOCIATED WITH TYPE 2 DIABETES MELLITUS: ICD-10-CM

## 2024-04-18 DIAGNOSIS — S81.801A OPEN WOUND OF RIGHT LOWER LEG, INITIAL ENCOUNTER: Primary | ICD-10-CM

## 2024-04-18 LAB — POCT GLUCOSE: 130 MG/DL (ref 70–110)

## 2024-04-18 PROCEDURE — 99212 OFFICE O/P EST SF 10 MIN: CPT | Mod: ,,, | Performed by: EMERGENCY MEDICINE

## 2024-04-18 PROCEDURE — 99212 OFFICE O/P EST SF 10 MIN: CPT

## 2024-04-18 PROCEDURE — 27000999 HC MEDICAL RECORD PHOTO DOCUMENTATION

## 2024-04-18 NOTE — PATIENT INSTRUCTIONS
Pt seen today by: Dr. Thais Wylie        Self care DRESSING INSTRUCTIONS:        Wound location: right lower leg    Cleanse wound with wound cleanser or saline or baby shampoo and water  Apply ointment into both wound beds  Cover with bandaid   Change dressing daily     Compression with: N/A    Return visit: Thursday, May 9th, 2024 at 10:00 am      Wound may have been debrided in clinic: if so, WHAT YOU NEED TO KNOW:    Debridement is the removal of infected, damaged, or dead tissue so a wound can heal properly. Your wound may need more than one debridement. Debridement can cause bleeding, and a small amount of blood is expected.  AFTER A DEBRIDEMENT:    Keep your wound clean and dry. Do not remove the dressing unless instructed.  Follow the wound care orders provided to you or your home health care provider.  If you have pain, take over the counter pain relievers or pain medication if prescribed.  Elevate the wound and limit excessive activity to prevent bleeding and/or swelling in your wound.  If you see blood coming through the dressing, apply gauze and tape over the dressing and hold firm pressure to the wound with your hand for 5-10 minutes continuously, without peeking, to help the bleeding stop.  Contact [facility] wound care team at [phone] or go to the nearest Emergency department if:    You have a fever greater than 101 taken by mouth.  Your pain gets worse or does not go away, even after taking your regular pain medicine.  Your skin around your wound is red, hot, swollen, or draining pus.  You have bleeding that continues to come through the dressing after holding pressure for 10 minutes       Compression: You may be using a compressive type of dressings to control edema: If so, keep your compression wrap or tubigrip in place. Do not get them wet, they should feel snug. If they feel tight, or cause pain in any way,  elevate your legs above your heart for 15 minutes. If the wraps still cause pain or  you can not tolerate compression,  please remove and notify the clinic or your home health.     Nutrition:  The current daily value (%DV) for protein is 50 grams per day and is meant as a general goal for most people. Further increasing your dietary protein intake is very important for wound healing. Typically one needs over 100g of protein per day to help with wound healing needs.  If you are a dialysis patient or have problems with your kidneys, talk to your Nephrologist about how much protein you can take in with your condition.  Examples of high protein items that can be added to your diet include: eggs, chicken, red meats, almonds, cottage cheese, Greek yogurt, beans, and peanut butter.  Fortified protein bars, shakes and drinks can add 15-30 additional grams of protein per serving.   Also add:   1 daily general multivitamin   Jomar : 1 packet twice daily   Vitamin C : 500mg twice daily   Zinc 220 mg daily  Vit D : once daily    Offloading   Offload your wound. This means to reduce pressure on and around the wound that reduces blood flow to the wound and prevents healing. Your wound care team will discuss specific ways for you to offload your specific wound. Common offloading strategies include:    Turn or reposition every 2 hours or sooner  Use pillows, wedges, ROHO wheelchair cushions or other special devices like boots and shoes to lift the wound off of hard surfaces  Alternating Low Air-loss (ALAL) mattress may be ordered  Padded dressings can reduce wound pressure        Call our [facility] wound clinic for questions/concerns a [phone]
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
## 2024-05-09 ENCOUNTER — HOSPITAL ENCOUNTER (OUTPATIENT)
Dept: WOUND CARE | Facility: HOSPITAL | Age: 69
Discharge: HOME | End: 2024-05-09
Attending: EMERGENCY MEDICINE
Payer: MEDICARE

## 2024-05-09 VITALS
HEIGHT: 66 IN | WEIGHT: 170 LBS | DIASTOLIC BLOOD PRESSURE: 85 MMHG | TEMPERATURE: 99 F | BODY MASS INDEX: 27.32 KG/M2 | RESPIRATION RATE: 20 BRPM | HEART RATE: 80 BPM | SYSTOLIC BLOOD PRESSURE: 137 MMHG

## 2024-05-09 DIAGNOSIS — L91.0 HYPERTROPHIC SCAR OF SKIN: ICD-10-CM

## 2024-05-09 DIAGNOSIS — S81.801A OPEN WOUND OF RIGHT LOWER LEG, INITIAL ENCOUNTER: Primary | ICD-10-CM

## 2024-05-09 DIAGNOSIS — I10 BENIGN HYPERTENSION: ICD-10-CM

## 2024-05-09 DIAGNOSIS — F31.9 BIPOLAR AFFECTIVE DISORDER, REMISSION STATUS UNSPECIFIED: ICD-10-CM

## 2024-05-09 DIAGNOSIS — I87.331 IDIOPATHIC CHRONIC VENOUS HTN OF RIGHT LEG WITH ULCER AND INFLAMMATION: ICD-10-CM

## 2024-05-09 DIAGNOSIS — E11.42 DIABETIC POLYNEUROPATHY ASSOCIATED WITH TYPE 2 DIABETES MELLITUS: ICD-10-CM

## 2024-05-09 DIAGNOSIS — L94.2 CALCINOSIS CUTIS: ICD-10-CM

## 2024-05-09 DIAGNOSIS — L90.5 SCAR CONDITION AND FIBROSIS OF SKIN: ICD-10-CM

## 2024-05-09 LAB — POCT GLUCOSE: 193 MG/DL (ref 70–110)

## 2024-05-09 PROCEDURE — 99212 OFFICE O/P EST SF 10 MIN: CPT

## 2024-05-09 PROCEDURE — 99212 OFFICE O/P EST SF 10 MIN: CPT | Mod: ,,, | Performed by: EMERGENCY MEDICINE

## 2024-05-09 PROCEDURE — 27000999 HC MEDICAL RECORD PHOTO DOCUMENTATION

## 2024-05-09 RX ORDER — CALCIUM CITRATE/VITAMIN D3 200MG-6.25
TABLET ORAL
COMMUNITY
Start: 2024-03-28
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
## 2024-05-09 NOTE — PATIENT INSTRUCTIONS
Pt seen today by: Dr. Thais Wylie      Self care DRESSING INSTRUCTIONS:        Wound location: right lower leg    Cleanse wound with wound cleanser or saline or baby shampoo and water  Apply ointment into both wound beds  Cover with bandaid   Change dressing daily     Compression with: N/A    Return visit: Thursday, June 6, 2024 at 10:00 am      Wound may have been debrided in clinic: if so, WHAT YOU NEED TO KNOW:    Debridement is the removal of infected, damaged, or dead tissue so a wound can heal properly. Your wound may need more than one debridement. Debridement can cause bleeding, and a small amount of blood is expected.  AFTER A DEBRIDEMENT:    Keep your wound clean and dry. Do not remove the dressing unless instructed.  Follow the wound care orders provided to you or your home health care provider.  If you have pain, take over the counter pain relievers or pain medication if prescribed.  Elevate the wound and limit excessive activity to prevent bleeding and/or swelling in your wound.  If you see blood coming through the dressing, apply gauze and tape over the dressing and hold firm pressure to the wound with your hand for 5-10 minutes continuously, without peeking, to help the bleeding stop.  Contact [facility] wound care team at [phone] or go to the nearest Emergency department if:    You have a fever greater than 101 taken by mouth.  Your pain gets worse or does not go away, even after taking your regular pain medicine.  Your skin around your wound is red, hot, swollen, or draining pus.  You have bleeding that continues to come through the dressing after holding pressure for 10 minutes       Compression: You may be using a compressive type of dressings to control edema: If so, keep your compression wrap or tubigrip in place. Do not get them wet, they should feel snug. If they feel tight, or cause pain in any way,  elevate your legs above your heart for 15 minutes. If the wraps still cause pain or  you can not tolerate compression,  please remove and notify the clinic or your home health.     Nutrition:  The current daily value (%DV) for protein is 50 grams per day and is meant as a general goal for most people. Further increasing your dietary protein intake is very important for wound healing. Typically one needs over 100g of protein per day to help with wound healing needs.  If you are a dialysis patient or have problems with your kidneys, talk to your Nephrologist about how much protein you can take in with your condition.  Examples of high protein items that can be added to your diet include: eggs, chicken, red meats, almonds, cottage cheese, Greek yogurt, beans, and peanut butter.  Fortified protein bars, shakes and drinks can add 15-30 additional grams of protein per serving.   Also add:   1 daily general multivitamin   Jomar : 1 packet twice daily   Vitamin C : 500mg twice daily   Zinc 220 mg daily  Vit D : once daily    Offloading   Offload your wound. This means to reduce pressure on and around the wound that reduces blood flow to the wound and prevents healing. Your wound care team will discuss specific ways for you to offload your specific wound. Common offloading strategies include:    Turn or reposition every 2 hours or sooner  Use pillows, wedges, ROHO wheelchair cushions or other special devices like boots and shoes to lift the wound off of hard surfaces  Alternating Low Air-loss (ALAL) mattress may be ordered  Padded dressings can reduce wound pressure        Call our [facility] wound clinic for questions/concerns a [phone]

## 2024-06-06 ENCOUNTER — HOSPITAL ENCOUNTER (OUTPATIENT)
Dept: WOUND CARE | Facility: HOSPITAL | Age: 69
Discharge: HOME | End: 2024-06-06
Attending: EMERGENCY MEDICINE
Payer: MEDICARE

## 2024-06-06 VITALS
SYSTOLIC BLOOD PRESSURE: 135 MMHG | BODY MASS INDEX: 27 KG/M2 | WEIGHT: 168 LBS | TEMPERATURE: 99 F | DIASTOLIC BLOOD PRESSURE: 82 MMHG | RESPIRATION RATE: 18 BRPM | HEIGHT: 66 IN | HEART RATE: 80 BPM

## 2024-06-06 DIAGNOSIS — L90.5 SCAR CONDITION AND FIBROSIS OF SKIN: ICD-10-CM

## 2024-06-06 DIAGNOSIS — L91.0 HYPERTROPHIC SCAR OF SKIN: ICD-10-CM

## 2024-06-06 DIAGNOSIS — S81.801A OPEN WOUND OF RIGHT LOWER LEG, INITIAL ENCOUNTER: Primary | ICD-10-CM

## 2024-06-06 DIAGNOSIS — L94.2 CALCINOSIS CUTIS: ICD-10-CM

## 2024-06-06 LAB — POCT GLUCOSE: 181 MG/DL (ref 70–110)

## 2024-06-06 PROCEDURE — 27000999 HC MEDICAL RECORD PHOTO DOCUMENTATION

## 2024-06-06 PROCEDURE — 99212 OFFICE O/P EST SF 10 MIN: CPT | Mod: ,,, | Performed by: EMERGENCY MEDICINE

## 2024-06-06 PROCEDURE — 99212 OFFICE O/P EST SF 10 MIN: CPT
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
## 2024-06-06 NOTE — PATIENT INSTRUCTIONS
Pt seen today by: Dr. Thais Wylie      Self care DRESSING INSTRUCTIONS:        Wound location: right lower leg    Cleanse wound with wound cleanser or saline or baby shampoo and water  Apply ointment into both wound beds  Cover with bandaid   Change dressing daily     Compression with: N/A    Return visit: Thursday, July 18, 2024 at 10:00 am      Wound may have been debrided in clinic: if so, WHAT YOU NEED TO KNOW:    Debridement is the removal of infected, damaged, or dead tissue so a wound can heal properly. Your wound may need more than one debridement. Debridement can cause bleeding, and a small amount of blood is expected.  AFTER A DEBRIDEMENT:    Keep your wound clean and dry. Do not remove the dressing unless instructed.  Follow the wound care orders provided to you or your home health care provider.  If you have pain, take over the counter pain relievers or pain medication if prescribed.  Elevate the wound and limit excessive activity to prevent bleeding and/or swelling in your wound.  If you see blood coming through the dressing, apply gauze and tape over the dressing and hold firm pressure to the wound with your hand for 5-10 minutes continuously, without peeking, to help the bleeding stop.  Contact [facility] wound care team at [phone] or go to the nearest Emergency department if:    You have a fever greater than 101 taken by mouth.  Your pain gets worse or does not go away, even after taking your regular pain medicine.  Your skin around your wound is red, hot, swollen, or draining pus.  You have bleeding that continues to come through the dressing after holding pressure for 10 minutes       Compression: You may be using a compressive type of dressings to control edema: If so, keep your compression wrap or tubigrip in place. Do not get them wet, they should feel snug. If they feel tight, or cause pain in any way,  elevate your legs above your heart for 15 minutes. If the wraps still cause pain or  you can not tolerate compression,  please remove and notify the clinic or your home health.     Nutrition:  The current daily value (%DV) for protein is 50 grams per day and is meant as a general goal for most people. Further increasing your dietary protein intake is very important for wound healing. Typically one needs over 100g of protein per day to help with wound healing needs.  If you are a dialysis patient or have problems with your kidneys, talk to your Nephrologist about how much protein you can take in with your condition.  Examples of high protein items that can be added to your diet include: eggs, chicken, red meats, almonds, cottage cheese, Greek yogurt, beans, and peanut butter.  Fortified protein bars, shakes and drinks can add 15-30 additional grams of protein per serving.   Also add:   1 daily general multivitamin   Jomar : 1 packet twice daily   Vitamin C : 500mg twice daily   Zinc 220 mg daily  Vit D : once daily    Offloading   Offload your wound. This means to reduce pressure on and around the wound that reduces blood flow to the wound and prevents healing. Your wound care team will discuss specific ways for you to offload your specific wound. Common offloading strategies include:    Turn or reposition every 2 hours or sooner  Use pillows, wedges, ROHO wheelchair cushions or other special devices like boots and shoes to lift the wound off of hard surfaces  Alternating Low Air-loss (ALAL) mattress may be ordered  Padded dressings can reduce wound pressure        Call our [facility] wound clinic for questions/concerns a [phone]

## 2024-07-18 ENCOUNTER — HOSPITAL ENCOUNTER (OUTPATIENT)
Dept: WOUND CARE | Facility: HOSPITAL | Age: 69
Discharge: HOME | End: 2024-07-18
Attending: EMERGENCY MEDICINE
Payer: MEDICARE

## 2024-07-18 VITALS
DIASTOLIC BLOOD PRESSURE: 85 MMHG | WEIGHT: 168 LBS | HEART RATE: 76 BPM | RESPIRATION RATE: 18 BRPM | HEIGHT: 66 IN | TEMPERATURE: 98 F | BODY MASS INDEX: 27 KG/M2 | SYSTOLIC BLOOD PRESSURE: 137 MMHG

## 2024-07-18 DIAGNOSIS — L91.0 HYPERTROPHIC SCAR OF SKIN: ICD-10-CM

## 2024-07-18 DIAGNOSIS — S81.801A OPEN WOUND OF RIGHT LOWER LEG, INITIAL ENCOUNTER: Primary | ICD-10-CM

## 2024-07-18 DIAGNOSIS — E11.42 DIABETIC POLYNEUROPATHY ASSOCIATED WITH TYPE 2 DIABETES MELLITUS: ICD-10-CM

## 2024-07-18 DIAGNOSIS — L90.5 SCAR CONDITION AND FIBROSIS OF SKIN: ICD-10-CM

## 2024-07-18 DIAGNOSIS — I87.331 IDIOPATHIC CHRONIC VENOUS HTN OF RIGHT LEG WITH ULCER AND INFLAMMATION: ICD-10-CM

## 2024-07-18 DIAGNOSIS — L94.2 CALCINOSIS CUTIS: ICD-10-CM

## 2024-07-18 LAB — POCT GLUCOSE: 215 MG/DL (ref 70–110)

## 2024-07-18 PROCEDURE — 99212 OFFICE O/P EST SF 10 MIN: CPT | Mod: ,,, | Performed by: EMERGENCY MEDICINE

## 2024-07-18 PROCEDURE — 99212 OFFICE O/P EST SF 10 MIN: CPT

## 2024-07-18 PROCEDURE — 27000999 HC MEDICAL RECORD PHOTO DOCUMENTATION

## 2024-07-18 RX ORDER — ASPIRIN 325 MG
TABLET, DELAYED RELEASE (ENTERIC COATED) ORAL
COMMUNITY
Start: 2024-07-08

## 2024-07-18 NOTE — PATIENT INSTRUCTIONS
Pt seen today by: Dr. Thais Wylie      Self care DRESSING INSTRUCTIONS:        Wound location: right lower leg    Cleanse wound with wound cleanser or saline or baby shampoo and water  Apply ointment into both wound beds  Cover with bandaid   Change dressing daily     Compression with: N/A    Return visit: Thursday, August 15, 2024 at 10:00 am      Wound may have been debrided in clinic: if so, WHAT YOU NEED TO KNOW:    Debridement is the removal of infected, damaged, or dead tissue so a wound can heal properly. Your wound may need more than one debridement. Debridement can cause bleeding, and a small amount of blood is expected.  AFTER A DEBRIDEMENT:    Keep your wound clean and dry. Do not remove the dressing unless instructed.  Follow the wound care orders provided to you or your home health care provider.  If you have pain, take over the counter pain relievers or pain medication if prescribed.  Elevate the wound and limit excessive activity to prevent bleeding and/or swelling in your wound.  If you see blood coming through the dressing, apply gauze and tape over the dressing and hold firm pressure to the wound with your hand for 5-10 minutes continuously, without peeking, to help the bleeding stop.  Contact [facility] wound care team at [phone] or go to the nearest Emergency department if:    You have a fever greater than 101 taken by mouth.  Your pain gets worse or does not go away, even after taking your regular pain medicine.  Your skin around your wound is red, hot, swollen, or draining pus.  You have bleeding that continues to come through the dressing after holding pressure for 10 minutes       Compression: You may be using a compressive type of dressings to control edema: If so, keep your compression wrap or tubigrip in place. Do not get them wet, they should feel snug. If they feel tight, or cause pain in any way,  elevate your legs above your heart for 15 minutes. If the wraps still cause pain or  you can not tolerate compression,  please remove and notify the clinic or your home health.     Nutrition:  The current daily value (%DV) for protein is 50 grams per day and is meant as a general goal for most people. Further increasing your dietary protein intake is very important for wound healing. Typically one needs over 100g of protein per day to help with wound healing needs.  If you are a dialysis patient or have problems with your kidneys, talk to your Nephrologist about how much protein you can take in with your condition.  Examples of high protein items that can be added to your diet include: eggs, chicken, red meats, almonds, cottage cheese, Greek yogurt, beans, and peanut butter.  Fortified protein bars, shakes and drinks can add 15-30 additional grams of protein per serving.   Also add:   1 daily general multivitamin   Jomar : 1 packet twice daily   Vitamin C : 500mg twice daily   Zinc 220 mg daily  Vit D : once daily    Offloading   Offload your wound. This means to reduce pressure on and around the wound that reduces blood flow to the wound and prevents healing. Your wound care team will discuss specific ways for you to offload your specific wound. Common offloading strategies include:    Turn or reposition every 2 hours or sooner  Use pillows, wedges, ROHO wheelchair cushions or other special devices like boots and shoes to lift the wound off of hard surfaces  Alternating Low Air-loss (ALAL) mattress may be ordered  Padded dressings can reduce wound pressure        Call our [facility] wound clinic for questions/concerns a [phone]
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
## 2024-08-15 ENCOUNTER — HOSPITAL ENCOUNTER (OUTPATIENT)
Dept: WOUND CARE | Facility: HOSPITAL | Age: 69
Discharge: HOME | End: 2024-08-15
Attending: EMERGENCY MEDICINE
Payer: MEDICARE

## 2024-08-15 VITALS
BODY MASS INDEX: 27 KG/M2 | HEIGHT: 66 IN | HEART RATE: 78 BPM | WEIGHT: 168 LBS | TEMPERATURE: 98 F | SYSTOLIC BLOOD PRESSURE: 130 MMHG | DIASTOLIC BLOOD PRESSURE: 84 MMHG | RESPIRATION RATE: 18 BRPM

## 2024-08-15 DIAGNOSIS — F31.9 BIPOLAR AFFECTIVE DISORDER, REMISSION STATUS UNSPECIFIED: ICD-10-CM

## 2024-08-15 DIAGNOSIS — L94.2 CALCINOSIS CUTIS: ICD-10-CM

## 2024-08-15 DIAGNOSIS — I87.331 IDIOPATHIC CHRONIC VENOUS HTN OF RIGHT LEG WITH ULCER AND INFLAMMATION: ICD-10-CM

## 2024-08-15 DIAGNOSIS — L90.5 SCAR CONDITION AND FIBROSIS OF SKIN: ICD-10-CM

## 2024-08-15 DIAGNOSIS — I10 BENIGN HYPERTENSION: ICD-10-CM

## 2024-08-15 DIAGNOSIS — S81.801A OPEN WOUND OF RIGHT LOWER LEG, INITIAL ENCOUNTER: Primary | ICD-10-CM

## 2024-08-15 DIAGNOSIS — L91.0 HYPERTROPHIC SCAR OF SKIN: ICD-10-CM

## 2024-08-15 DIAGNOSIS — E11.42 DIABETIC POLYNEUROPATHY ASSOCIATED WITH TYPE 2 DIABETES MELLITUS: ICD-10-CM

## 2024-08-15 LAB — POCT GLUCOSE: 203 MG/DL (ref 70–110)

## 2024-08-15 PROCEDURE — 99212 OFFICE O/P EST SF 10 MIN: CPT | Mod: ,,, | Performed by: EMERGENCY MEDICINE

## 2024-08-15 PROCEDURE — 27000999 HC MEDICAL RECORD PHOTO DOCUMENTATION

## 2024-08-15 NOTE — PATIENT INSTRUCTIONS
Pt seen today by: Dr. Thais Wylie      Self care DRESSING INSTRUCTIONS:        Wound location: right lower leg    Cleanse wound with wound cleanser or saline or baby shampoo and water  Apply ointment into both wound beds  Cover with bandaid   Change dressing daily     Compression with: N/A    Return visit: Thursday, September 12, 2024 at 10:00 am      Wound may have been debrided in clinic: if so, WHAT YOU NEED TO KNOW:    Debridement is the removal of infected, damaged, or dead tissue so a wound can heal properly. Your wound may need more than one debridement. Debridement can cause bleeding, and a small amount of blood is expected.  AFTER A DEBRIDEMENT:    Keep your wound clean and dry. Do not remove the dressing unless instructed.  Follow the wound care orders provided to you or your home health care provider.  If you have pain, take over the counter pain relievers or pain medication if prescribed.  Elevate the wound and limit excessive activity to prevent bleeding and/or swelling in your wound.  If you see blood coming through the dressing, apply gauze and tape over the dressing and hold firm pressure to the wound with your hand for 5-10 minutes continuously, without peeking, to help the bleeding stop.  Contact [facility] wound care team at [phone] or go to the nearest Emergency department if:    You have a fever greater than 101 taken by mouth.  Your pain gets worse or does not go away, even after taking your regular pain medicine.  Your skin around your wound is red, hot, swollen, or draining pus.  You have bleeding that continues to come through the dressing after holding pressure for 10 minutes       Compression: You may be using a compressive type of dressings to control edema: If so, keep your compression wrap or tubigrip in place. Do not get them wet, they should feel snug. If they feel tight, or cause pain in any way,  elevate your legs above your heart for 15 minutes. If the wraps still cause pain  or you can not tolerate compression,  please remove and notify the clinic or your home health.     Nutrition:  The current daily value (%DV) for protein is 50 grams per day and is meant as a general goal for most people. Further increasing your dietary protein intake is very important for wound healing. Typically one needs over 100g of protein per day to help with wound healing needs.  If you are a dialysis patient or have problems with your kidneys, talk to your Nephrologist about how much protein you can take in with your condition.  Examples of high protein items that can be added to your diet include: eggs, chicken, red meats, almonds, cottage cheese, Greek yogurt, beans, and peanut butter.  Fortified protein bars, shakes and drinks can add 15-30 additional grams of protein per serving.   Also add:   1 daily general multivitamin   Jomar : 1 packet twice daily   Vitamin C : 500mg twice daily   Zinc 220 mg daily  Vit D : once daily    Offloading   Offload your wound. This means to reduce pressure on and around the wound that reduces blood flow to the wound and prevents healing. Your wound care team will discuss specific ways for you to offload your specific wound. Common offloading strategies include:    Turn or reposition every 2 hours or sooner  Use pillows, wedges, ROHO wheelchair cushions or other special devices like boots and shoes to lift the wound off of hard surfaces  Alternating Low Air-loss (ALAL) mattress may be ordered  Padded dressings can reduce wound pressure        Call our [facility] wound clinic for questions/concerns a [phone]
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
## 2024-09-12 ENCOUNTER — HOSPITAL ENCOUNTER (OUTPATIENT)
Dept: WOUND CARE | Facility: HOSPITAL | Age: 69
Discharge: HOME | End: 2024-09-12
Attending: EMERGENCY MEDICINE
Payer: MEDICARE

## 2024-09-12 VITALS
RESPIRATION RATE: 16 BRPM | SYSTOLIC BLOOD PRESSURE: 146 MMHG | WEIGHT: 168 LBS | BODY MASS INDEX: 27 KG/M2 | TEMPERATURE: 99 F | HEIGHT: 66 IN | DIASTOLIC BLOOD PRESSURE: 78 MMHG | HEART RATE: 79 BPM

## 2024-09-12 DIAGNOSIS — E11.42 DIABETIC POLYNEUROPATHY ASSOCIATED WITH TYPE 2 DIABETES MELLITUS: ICD-10-CM

## 2024-09-12 DIAGNOSIS — L94.2 CALCINOSIS CUTIS: ICD-10-CM

## 2024-09-12 DIAGNOSIS — L90.5 SCAR CONDITION AND FIBROSIS OF SKIN: ICD-10-CM

## 2024-09-12 DIAGNOSIS — I10 BENIGN HYPERTENSION: ICD-10-CM

## 2024-09-12 DIAGNOSIS — F31.9 BIPOLAR AFFECTIVE DISORDER, REMISSION STATUS UNSPECIFIED: ICD-10-CM

## 2024-09-12 DIAGNOSIS — I87.331 IDIOPATHIC CHRONIC VENOUS HTN OF RIGHT LEG WITH ULCER AND INFLAMMATION: ICD-10-CM

## 2024-09-12 DIAGNOSIS — S81.801A OPEN WOUND OF RIGHT LOWER LEG, INITIAL ENCOUNTER: Primary | ICD-10-CM

## 2024-09-12 DIAGNOSIS — L91.0 HYPERTROPHIC SCAR OF SKIN: ICD-10-CM

## 2024-09-12 LAB — POCT GLUCOSE: 184 MG/DL (ref 70–110)

## 2024-09-12 PROCEDURE — 27000999 HC MEDICAL RECORD PHOTO DOCUMENTATION

## 2024-09-12 PROCEDURE — 99212 OFFICE O/P EST SF 10 MIN: CPT | Mod: ,,, | Performed by: EMERGENCY MEDICINE

## 2024-09-12 PROCEDURE — 99212 OFFICE O/P EST SF 10 MIN: CPT

## 2024-09-12 NOTE — PATIENT INSTRUCTIONS
Pt seen today by: Dr. Thais Wylie    Self care DRESSING INSTRUCTIONS:    Wound location: right lower leg    Cleanse wound with wound cleanser or saline or baby shampoo and water  Apply mupirocin ointment into both wound beds  Cover with bandaid   Change dressing daily    Return visit: Thursday, October 10, 2024 at 10:00 am    Nutrition:  The current daily value (%DV) for protein is 50 grams per day and is meant as a general goal for most people. Further increasing your dietary protein intake is very important for wound healing. Typically one needs over 100g of protein per day to help with wound healing needs.  If you are a dialysis patient or have problems with your kidneys, talk to your Nephrologist about how much protein you can take in with your condition.  Examples of high protein items that can be added to your diet include: eggs, chicken, red meats, almonds, cottage cheese, Greek yogurt, beans, and peanut butter.  Fortified protein bars, shakes and drinks can add 15-30 additional grams of protein per serving.   Also add:   1 daily general multivitamin   Jomar : 1 packet twice daily   Vitamin C : 500mg twice daily   Zinc 220 mg daily  Vit D : once daily    Call our [facility] wound clinic for questions/concerns a [phone]
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
## 2024-10-10 ENCOUNTER — HOSPITAL ENCOUNTER (OUTPATIENT)
Dept: WOUND CARE | Facility: HOSPITAL | Age: 69
Discharge: HOME | End: 2024-10-10
Attending: EMERGENCY MEDICINE
Payer: MEDICARE

## 2024-10-10 VITALS
TEMPERATURE: 98 F | HEIGHT: 66 IN | HEART RATE: 72 BPM | RESPIRATION RATE: 20 BRPM | DIASTOLIC BLOOD PRESSURE: 80 MMHG | BODY MASS INDEX: 26.84 KG/M2 | WEIGHT: 167 LBS | SYSTOLIC BLOOD PRESSURE: 153 MMHG

## 2024-10-10 DIAGNOSIS — L94.2 CALCINOSIS CUTIS: ICD-10-CM

## 2024-10-10 DIAGNOSIS — L90.5 SCAR CONDITION AND FIBROSIS OF SKIN: ICD-10-CM

## 2024-10-10 DIAGNOSIS — S81.801A OPEN WOUND OF RIGHT LOWER LEG, INITIAL ENCOUNTER: Primary | ICD-10-CM

## 2024-10-10 DIAGNOSIS — I87.331 IDIOPATHIC CHRONIC VENOUS HTN OF RIGHT LEG WITH ULCER AND INFLAMMATION: ICD-10-CM

## 2024-10-10 DIAGNOSIS — E11.42 DIABETIC POLYNEUROPATHY ASSOCIATED WITH TYPE 2 DIABETES MELLITUS: ICD-10-CM

## 2024-10-10 DIAGNOSIS — L91.0 HYPERTROPHIC SCAR OF SKIN: ICD-10-CM

## 2024-10-10 LAB — POCT GLUCOSE: 170 MG/DL (ref 70–110)

## 2024-10-10 PROCEDURE — 99212 OFFICE O/P EST SF 10 MIN: CPT

## 2024-10-10 PROCEDURE — 27000999 HC MEDICAL RECORD PHOTO DOCUMENTATION
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
## 2024-10-10 NOTE — PATIENT INSTRUCTIONS
Pt seen today by: Dr. Thais Wylie    Self care DRESSING INSTRUCTIONS:    Wound location: right lower leg    Cleanse wound with wound cleanser or saline or baby shampoo and water  Apply mupirocin ointment into both wound beds  Cover with bandaid   Change dressing daily    Return visit: Thursday, Nov 7th, 2024 at 10:00 am    Nutrition:  The current daily value (%DV) for protein is 50 grams per day and is meant as a general goal for most people. Further increasing your dietary protein intake is very important for wound healing. Typically one needs over 100g of protein per day to help with wound healing needs.  If you are a dialysis patient or have problems with your kidneys, talk to your Nephrologist about how much protein you can take in with your condition.  Examples of high protein items that can be added to your diet include: eggs, chicken, red meats, almonds, cottage cheese, Greek yogurt, beans, and peanut butter.  Fortified protein bars, shakes and drinks can add 15-30 additional grams of protein per serving.   Also add:   1 daily general multivitamin   Jomar : 1 packet twice daily   Vitamin C : 500mg twice daily   Zinc 220 mg daily  Vit D : once daily    Call our [facility] wound clinic for questions/concerns a [phone]

## 2024-11-07 ENCOUNTER — HOSPITAL ENCOUNTER (OUTPATIENT)
Dept: WOUND CARE | Facility: HOSPITAL | Age: 69
Discharge: HOME | End: 2024-11-07
Attending: EMERGENCY MEDICINE
Payer: MEDICARE

## 2024-11-07 VITALS
HEART RATE: 88 BPM | HEIGHT: 66 IN | DIASTOLIC BLOOD PRESSURE: 91 MMHG | BODY MASS INDEX: 26.84 KG/M2 | WEIGHT: 167 LBS | TEMPERATURE: 99 F | RESPIRATION RATE: 16 BRPM | SYSTOLIC BLOOD PRESSURE: 148 MMHG

## 2024-11-07 DIAGNOSIS — L94.2 CALCINOSIS CUTIS: ICD-10-CM

## 2024-11-07 DIAGNOSIS — S81.801A OPEN WOUND OF RIGHT LOWER LEG, INITIAL ENCOUNTER: Primary | ICD-10-CM

## 2024-11-07 DIAGNOSIS — F31.9 BIPOLAR AFFECTIVE DISORDER, REMISSION STATUS UNSPECIFIED: ICD-10-CM

## 2024-11-07 DIAGNOSIS — I87.331 IDIOPATHIC CHRONIC VENOUS HTN OF RIGHT LEG WITH ULCER AND INFLAMMATION: ICD-10-CM

## 2024-11-07 DIAGNOSIS — E78.5 HYPERLIPIDEMIA, UNSPECIFIED HYPERLIPIDEMIA TYPE: ICD-10-CM

## 2024-11-07 DIAGNOSIS — L91.0 HYPERTROPHIC SCAR OF SKIN: ICD-10-CM

## 2024-11-07 DIAGNOSIS — E11.42 DIABETIC POLYNEUROPATHY ASSOCIATED WITH TYPE 2 DIABETES MELLITUS: ICD-10-CM

## 2024-11-07 DIAGNOSIS — I10 BENIGN HYPERTENSION: ICD-10-CM

## 2024-11-07 DIAGNOSIS — L90.5 SCAR CONDITION AND FIBROSIS OF SKIN: ICD-10-CM

## 2024-11-07 LAB — POCT GLUCOSE: 241 MG/DL (ref 70–110)

## 2024-11-07 PROCEDURE — 27000999 HC MEDICAL RECORD PHOTO DOCUMENTATION

## 2024-11-07 PROCEDURE — 99212 OFFICE O/P EST SF 10 MIN: CPT

## 2024-11-07 PROCEDURE — 99212 OFFICE O/P EST SF 10 MIN: CPT | Mod: ,,, | Performed by: EMERGENCY MEDICINE

## 2024-11-07 RX ORDER — SULFAMETHOXAZOLE AND TRIMETHOPRIM 800; 160 MG/1; MG/1
1 TABLET ORAL EVERY 12 HOURS
Qty: 28 TABLET | Refills: 0 | Status: SHIPPED | OUTPATIENT
Start: 2024-11-07 | End: 2024-11-21
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
## 2024-11-07 NOTE — PATIENT INSTRUCTIONS
Pt seen today by: Dr. Thais Wylie    Self care DRESSING INSTRUCTIONS:    Go  antibiotics at Pharmacy    Wound location: right lower leg    Cleanse wound with wound cleanser or saline or baby shampoo and water  Apply mupirocin ointment into both wound beds  Cover with bandaid   Change dressing daily    Return visit: Thursday, Dec 5th, 2024 at 10:00 am    Nutrition:  The current daily value (%DV) for protein is 50 grams per day and is meant as a general goal for most people. Further increasing your dietary protein intake is very important for wound healing. Typically one needs over 100g of protein per day to help with wound healing needs.  If you are a dialysis patient or have problems with your kidneys, talk to your Nephrologist about how much protein you can take in with your condition.  Examples of high protein items that can be added to your diet include: eggs, chicken, red meats, almonds, cottage cheese, Greek yogurt, beans, and peanut butter.  Fortified protein bars, shakes and drinks can add 15-30 additional grams of protein per serving.   Also add:   1 daily general multivitamin   Jomar : 1 packet twice daily   Vitamin C : 500mg twice daily   Zinc 220 mg daily  Vit D : once daily    Call our [facility] wound clinic for questions/concerns a [phone]

## 2024-11-09 ENCOUNTER — HOSPITAL ENCOUNTER (EMERGENCY)
Facility: HOSPITAL | Age: 69
Discharge: HOME | End: 2024-11-09
Payer: MEDICARE

## 2024-11-09 VITALS
HEART RATE: 88 BPM | WEIGHT: 168 LBS | DIASTOLIC BLOOD PRESSURE: 85 MMHG | RESPIRATION RATE: 17 BRPM | BODY MASS INDEX: 27 KG/M2 | TEMPERATURE: 98 F | SYSTOLIC BLOOD PRESSURE: 156 MMHG | HEIGHT: 66 IN | OXYGEN SATURATION: 96 %

## 2024-11-09 DIAGNOSIS — R60.9 SWELLING: ICD-10-CM

## 2024-11-09 DIAGNOSIS — M25.522 LEFT ELBOW PAIN: Primary | ICD-10-CM

## 2024-11-09 LAB
ALBUMIN SERPL-MCNC: 3.8 G/DL (ref 3.4–4.8)
ALBUMIN/GLOB SERPL: 0.9 RATIO (ref 1.1–2)
ALP SERPL-CCNC: 131 UNIT/L (ref 40–150)
ALT SERPL-CCNC: 29 UNIT/L (ref 0–55)
ANION GAP SERPL CALC-SCNC: 10 MEQ/L
AST SERPL-CCNC: 21 UNIT/L (ref 5–34)
BASOPHILS # BLD AUTO: 0.11 X10(3)/MCL
BASOPHILS NFR BLD AUTO: 1.4 %
BILIRUB SERPL-MCNC: 0.3 MG/DL
BUN SERPL-MCNC: 11.1 MG/DL (ref 9.8–20.1)
CALCIUM SERPL-MCNC: 9.7 MG/DL (ref 8.4–10.2)
CHLORIDE SERPL-SCNC: 108 MMOL/L (ref 98–107)
CO2 SERPL-SCNC: 21 MMOL/L (ref 23–31)
CREAT SERPL-MCNC: 0.79 MG/DL (ref 0.55–1.02)
CREAT/UREA NIT SERPL: 14
EOSINOPHIL # BLD AUTO: 0.11 X10(3)/MCL (ref 0–0.9)
EOSINOPHIL NFR BLD AUTO: 1.4 %
ERYTHROCYTE [DISTWIDTH] IN BLOOD BY AUTOMATED COUNT: 12.4 % (ref 11.5–17)
GFR SERPLBLD CREATININE-BSD FMLA CKD-EPI: >60 ML/MIN/1.73/M2
GLOBULIN SER-MCNC: 4.4 GM/DL (ref 2.4–3.5)
GLUCOSE SERPL-MCNC: 243 MG/DL (ref 82–115)
HCT VFR BLD AUTO: 44.2 % (ref 37–47)
HGB BLD-MCNC: 14.2 G/DL (ref 12–16)
IMM GRANULOCYTES # BLD AUTO: 0.03 X10(3)/MCL (ref 0–0.04)
IMM GRANULOCYTES NFR BLD AUTO: 0.4 %
LYMPHOCYTES # BLD AUTO: 4.03 X10(3)/MCL (ref 0.6–4.6)
LYMPHOCYTES NFR BLD AUTO: 50.2 %
MCH RBC QN AUTO: 27.4 PG (ref 27–31)
MCHC RBC AUTO-ENTMCNC: 32.1 G/DL (ref 33–36)
MCV RBC AUTO: 85.3 FL (ref 80–94)
MONOCYTES # BLD AUTO: 0.58 X10(3)/MCL (ref 0.1–1.3)
MONOCYTES NFR BLD AUTO: 7.2 %
NEUTROPHILS # BLD AUTO: 3.17 X10(3)/MCL (ref 2.1–9.2)
NEUTROPHILS NFR BLD AUTO: 39.4 %
NRBC BLD AUTO-RTO: 0 %
PLATELET # BLD AUTO: 252 X10(3)/MCL (ref 130–400)
PMV BLD AUTO: 10.8 FL (ref 7.4–10.4)
POTASSIUM SERPL-SCNC: 4.2 MMOL/L (ref 3.5–5.1)
PROT SERPL-MCNC: 8.2 GM/DL (ref 5.8–7.6)
RBC # BLD AUTO: 5.18 X10(6)/MCL (ref 4.2–5.4)
SODIUM SERPL-SCNC: 139 MMOL/L (ref 136–145)
WBC # BLD AUTO: 8.03 X10(3)/MCL (ref 4.5–11.5)

## 2024-11-09 PROCEDURE — 99284 EMERGENCY DEPT VISIT MOD MDM: CPT | Mod: 25

## 2024-11-09 PROCEDURE — 80053 COMPREHEN METABOLIC PANEL: CPT | Performed by: NURSE PRACTITIONER

## 2024-11-09 PROCEDURE — 85025 COMPLETE CBC W/AUTO DIFF WBC: CPT | Performed by: NURSE PRACTITIONER

## 2024-11-09 PROCEDURE — 25000003 PHARM REV CODE 250: Performed by: NURSE PRACTITIONER

## 2024-11-09 RX ORDER — ACETAMINOPHEN 325 MG/1
650 TABLET ORAL
Status: COMPLETED | OUTPATIENT
Start: 2024-11-09 | End: 2024-11-09

## 2024-11-09 RX ADMIN — ACETAMINOPHEN 650 MG: 325 TABLET, FILM COATED ORAL at 11:11

## 2024-11-09 NOTE — ED PROVIDER NOTES
Encounter Date: 11/9/2024       History     Chief Complaint   Patient presents with    Arm Swelling     Pt co left elbow swollen, hot to touch since yesterday. No bruising and pt denies injury.      SEE MDM        Review of patient's allergies indicates:   Allergen Reactions    Aspirin Hives     Past Medical History:   Diagnosis Date    Calcinosis cutis     Diabetes mellitus, type 2     DM (diabetes mellitus)     HTN (hypertension)      Past Surgical History:   Procedure Laterality Date    SMALL INTESTINE SURGERY  03/10/1992    TUBAL LIGATION N/A      Family History   Problem Relation Name Age of Onset    Diabetes Mother      Hypertension Mother      Diabetes Father       Social History     Tobacco Use    Smoking status: Never    Smokeless tobacco: Never   Substance Use Topics    Alcohol use: Never     Review of Systems   Musculoskeletal:  Positive for joint swelling.   All other systems reviewed and are negative.      Physical Exam     Initial Vitals [11/09/24 0929]   BP Pulse Resp Temp SpO2   (!) 168/87 93 20 98.4 °F (36.9 °C) 98 %      MAP       --         Physical Exam    Constitutional: She appears well-developed and well-nourished.   HENT:   Head: Normocephalic and atraumatic.   Eyes: EOM are normal. Pupils are equal, round, and reactive to light.   Neck: Neck supple.   Normal range of motion.  Cardiovascular:  Normal rate, regular rhythm and normal heart sounds.           Pulmonary/Chest: Breath sounds normal.   Musculoskeletal:      Left elbow: Decreased range of motion. Tenderness present.      Cervical back: Normal range of motion and neck supple.      Comments: Warm to touch to left elbow           ED Course   Procedures  Labs Reviewed   COMPREHENSIVE METABOLIC PANEL - Abnormal       Result Value    Sodium 139      Potassium 4.2      Chloride 108 (*)     CO2 21 (*)     Glucose 243 (*)     Blood Urea Nitrogen 11.1      Creatinine 0.79      Calcium 9.7      Protein Total 8.2 (*)     Albumin 3.8       Globulin 4.4 (*)     Albumin/Globulin Ratio 0.9 (*)     Bilirubin Total 0.3            ALT 29      AST 21      eGFR >60      Anion Gap 10.0      BUN/Creatinine Ratio 14     CBC WITH DIFFERENTIAL - Abnormal    WBC 8.03      RBC 5.18      Hgb 14.2      Hct 44.2      MCV 85.3      MCH 27.4      MCHC 32.1 (*)     RDW 12.4      Platelet 252      MPV 10.8 (*)     Neut % 39.4      Lymph % 50.2      Mono % 7.2      Eos % 1.4      Basophil % 1.4      Lymph # 4.03      Neut # 3.17      Mono # 0.58      Eos # 0.11      Baso # 0.11      IG# 0.03      IG% 0.4      NRBC% 0.0     CBC W/ AUTO DIFFERENTIAL    Narrative:     The following orders were created for panel order CBC Auto Differential.  Procedure                               Abnormality         Status                     ---------                               -----------         ------                     CBC with Differential[[id number]]       Abnormal            Final result                 Please view results for these tests on the individual orders.          Imaging Results              X-Ray Forearm Left (Final result)  Result time 11/09/24 10:13:24      Final result by Enoc Bennett MD (11/09/24 10:13:24)                   Impression:      No acute osseous process identified.      Electronically signed by: Enoc Bennett  Date:    11/09/2024  Time:    10:13               Narrative:    EXAMINATION:  XR FOREARM LEFT    CLINICAL HISTORY:  Edema, unspecified    TECHNIQUE:  Frontal and lateral views of the left forearm.    COMPARISON:  None    FINDINGS:  No acute fracture identified.  Joint alignments are maintained with underlying mild degenerative changes.  No tracking soft tissue gas.                                       Medications   acetaminophen tablet 650 mg (650 mg Oral Given 11/9/24 1147)     Medical Decision Making  The patient is a 69 y.o. female with a history of HTN and DM  who presents to the  Emergency Department with a chief complaint of  left  elbow pain . Symptoms began yesterday  and have been consistent  since onset. Her  pain is currently rated as a 3/10 in severity and described as aching pain with no radiation. Associated symptoms include nothing . Symptoms are aggravated with movement and touching  and there are no alleviating factors. The patient denies fever. She reports taking nothing  prior to arrival with no relief of symptoms. No other reported symptoms at this time . Denies any fall     History obtained by patient  Differential Diagnosis: OA/Gout/ cellulitis but not limited too     Amount and/or Complexity of Data Reviewed  Labs: ordered. Decision-making details documented in ED Course.  Radiology: ordered.    Risk  OTC drugs.               ED Course as of 11/09/24 1149   Sat Nov 09, 2024   1115 -Degenerative changes noted to left elbow [CB]   1121 Explained labs and Xray results. Will treat with NSAID for pain. Advise patient follow up with PCP to r/o gout. All questions and concerns have been answered prior to discharge [CB]   1122 Glucose(!): 243 [CB]      ED Course User Index  [CB] Opal Jimenez FNP                           Clinical Impression:  Final diagnoses:  [R60.9] Swelling  [M25.522] Left elbow pain (Primary)                 Opal Jimenez FNP  11/09/24 1149

## 2024-11-09 NOTE — FIRST PROVIDER EVALUATION
Medical screening examination initiated.  I have conducted a focused provider triage encounter, findings are as follows:    Brief history of present illness:  68y/o F presents to the ED with left  forearm/elbow pain/swelling.  Reports increase pain with extending left arm. Forearm warm to touch.Onset 1 day    There were no vitals filed for this visit.    Pertinent physical exam:  AAA x 3    Brief workup plan:  Imaging/labs    Preliminary workup initiated; this workup will be continued and followed by the physician or advanced practice provider that is assigned to the patient when roomed.

## 2024-11-09 NOTE — DISCHARGE INSTRUCTIONS
Thanks for letting us take care of you today!  It is our goal to give you courteous care and to keep you comfortable and informed, if you have any questions before you leave I will be happy to try and answer them.    Here is some advice after your visit:      Your visit in the emergency department is NOT definitive care - please follow-up with your primary care doctor and/or specialist within 1-2 days.  Please return if you have any worsening in your condition or if you have any other concerns.    Please follow up with PCP on Monday to gout workup  Take Tylenol for pain as needed     Please review any LAB WORK from your visit today with your primary care physician.

## 2024-12-05 ENCOUNTER — HOSPITAL ENCOUNTER (OUTPATIENT)
Dept: WOUND CARE | Facility: HOSPITAL | Age: 69
Discharge: HOME | End: 2024-12-05
Attending: EMERGENCY MEDICINE
Payer: MEDICARE

## 2024-12-05 VITALS
WEIGHT: 168 LBS | TEMPERATURE: 98 F | RESPIRATION RATE: 16 BRPM | HEIGHT: 66 IN | BODY MASS INDEX: 27 KG/M2 | SYSTOLIC BLOOD PRESSURE: 139 MMHG | DIASTOLIC BLOOD PRESSURE: 83 MMHG | HEART RATE: 76 BPM

## 2024-12-05 DIAGNOSIS — L90.5 SCAR CONDITION AND FIBROSIS OF SKIN: ICD-10-CM

## 2024-12-05 DIAGNOSIS — I87.331 IDIOPATHIC CHRONIC VENOUS HTN OF RIGHT LEG WITH ULCER AND INFLAMMATION: ICD-10-CM

## 2024-12-05 DIAGNOSIS — L91.0 HYPERTROPHIC SCAR OF SKIN: ICD-10-CM

## 2024-12-05 DIAGNOSIS — E11.628 DIABETES WITH SKIN COMPLICATION: ICD-10-CM

## 2024-12-05 DIAGNOSIS — S81.801A OPEN WOUND OF RIGHT LOWER LEG, INITIAL ENCOUNTER: Primary | ICD-10-CM

## 2024-12-05 DIAGNOSIS — L94.2 CALCINOSIS CUTIS: ICD-10-CM

## 2024-12-05 LAB — POCT GLUCOSE: 212 MG/DL (ref 70–110)

## 2024-12-05 PROCEDURE — 99212 OFFICE O/P EST SF 10 MIN: CPT

## 2024-12-05 PROCEDURE — 27000999 HC MEDICAL RECORD PHOTO DOCUMENTATION

## 2024-12-05 PROCEDURE — 99212 OFFICE O/P EST SF 10 MIN: CPT | Mod: ,,, | Performed by: EMERGENCY MEDICINE
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
## 2024-12-05 NOTE — PATIENT INSTRUCTIONS
Pt seen today by: Dr. Thais Wylie    Self care DRESSING INSTRUCTIONS:        Wound location: right lower leg    Cleanse wound with wound cleanser or saline or baby shampoo and water  Apply mupirocin ointment into both wound beds  Cover with bandaid   Change dressing daily    Return visit: Thursday, January 9, 2025 at 10:00 am    Nutrition:  The current daily value (%DV) for protein is 50 grams per day and is meant as a general goal for most people. Further increasing your dietary protein intake is very important for wound healing. Typically one needs over 100g of protein per day to help with wound healing needs.  If you are a dialysis patient or have problems with your kidneys, talk to your Nephrologist about how much protein you can take in with your condition.  Examples of high protein items that can be added to your diet include: eggs, chicken, red meats, almonds, cottage cheese, Greek yogurt, beans, and peanut butter.  Fortified protein bars, shakes and drinks can add 15-30 additional grams of protein per serving.   Also add:   1 daily general multivitamin   Jomar : 1 packet twice daily   Vitamin C : 500mg twice daily   Zinc 220 mg daily  Vit D : once daily    Call our [facility] wound clinic for questions/concerns a [phone]

## 2025-01-09 ENCOUNTER — HOSPITAL ENCOUNTER (OUTPATIENT)
Dept: WOUND CARE | Facility: HOSPITAL | Age: 70
Discharge: HOME | End: 2025-01-09
Attending: EMERGENCY MEDICINE
Payer: MEDICARE

## 2025-01-09 VITALS
TEMPERATURE: 99 F | BODY MASS INDEX: 27.44 KG/M2 | SYSTOLIC BLOOD PRESSURE: 148 MMHG | WEIGHT: 170 LBS | HEART RATE: 75 BPM | DIASTOLIC BLOOD PRESSURE: 73 MMHG | RESPIRATION RATE: 18 BRPM

## 2025-01-09 DIAGNOSIS — L90.5 SCAR CONDITION AND FIBROSIS OF SKIN: ICD-10-CM

## 2025-01-09 DIAGNOSIS — S81.801A OPEN WOUND OF RIGHT LOWER LEG, INITIAL ENCOUNTER: Primary | ICD-10-CM

## 2025-01-09 DIAGNOSIS — I87.331 IDIOPATHIC CHRONIC VENOUS HTN OF RIGHT LEG WITH ULCER AND INFLAMMATION: ICD-10-CM

## 2025-01-09 DIAGNOSIS — L94.2 CALCINOSIS CUTIS: ICD-10-CM

## 2025-01-09 DIAGNOSIS — E11.628 DIABETES WITH SKIN COMPLICATION: ICD-10-CM

## 2025-01-09 DIAGNOSIS — L91.0 HYPERTROPHIC SCAR OF SKIN: ICD-10-CM

## 2025-01-09 LAB — POCT GLUCOSE: 161 MG/DL (ref 70–110)

## 2025-01-09 PROCEDURE — 27000999 HC MEDICAL RECORD PHOTO DOCUMENTATION

## 2025-01-09 PROCEDURE — 99212 OFFICE O/P EST SF 10 MIN: CPT

## 2025-01-09 PROCEDURE — 99212 OFFICE O/P EST SF 10 MIN: CPT | Mod: ,,, | Performed by: EMERGENCY MEDICINE
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
## 2025-01-09 NOTE — PATIENT INSTRUCTIONS
Pt seen today by: Dr. Thais Wylie    Self care DRESSING INSTRUCTIONS:        Wound location: right lower leg    Cleanse wound with wound cleanser or saline or baby shampoo and water  Apply mupirocin ointment to both wound beds  Cover with bandaid   Change dressing daily    Return visit: Thursday, February 6, 2025 at 10:00 am    Nutrition:  The current daily value (%DV) for protein is 50 grams per day and is meant as a general goal for most people. Further increasing your dietary protein intake is very important for wound healing. Typically one needs over 100g of protein per day to help with wound healing needs.  If you are a dialysis patient or have problems with your kidneys, talk to your Nephrologist about how much protein you can take in with your condition.  Examples of high protein items that can be added to your diet include: eggs, chicken, red meats, almonds, cottage cheese, Greek yogurt, beans, and peanut butter.  Fortified protein bars, shakes and drinks can add 15-30 additional grams of protein per serving.   Also add:   1 daily general multivitamin   Jomar : 1 packet twice daily   Vitamin C : 500mg twice daily   Zinc 220 mg daily  Vit D : once daily    Call our [facility] wound clinic for questions/concerns a [phone]

## 2025-01-28 PROCEDURE — 82270 OCCULT BLOOD FECES: CPT | Performed by: INTERNAL MEDICINE

## 2025-01-31 ENCOUNTER — LAB REQUISITION (OUTPATIENT)
Dept: LAB | Facility: HOSPITAL | Age: 70
End: 2025-01-31
Payer: MEDICARE

## 2025-01-31 DIAGNOSIS — Z86.0100 PERSONAL HISTORY OF COLON POLYPS, UNSPECIFIED: ICD-10-CM

## 2025-01-31 LAB
HEMOCCULT SP1 STL QL: NEGATIVE
HEMOCCULT SP2 STL QL: NEGATIVE
HEMOCCULT SP3 STL QL: NEGATIVE

## 2025-02-06 ENCOUNTER — HOSPITAL ENCOUNTER (OUTPATIENT)
Dept: WOUND CARE | Facility: HOSPITAL | Age: 70
Discharge: HOME | End: 2025-02-06
Attending: EMERGENCY MEDICINE
Payer: MEDICARE

## 2025-02-06 VITALS
HEIGHT: 66 IN | TEMPERATURE: 98 F | WEIGHT: 170 LBS | DIASTOLIC BLOOD PRESSURE: 88 MMHG | SYSTOLIC BLOOD PRESSURE: 144 MMHG | HEART RATE: 98 BPM | RESPIRATION RATE: 16 BRPM | BODY MASS INDEX: 27.32 KG/M2

## 2025-02-06 DIAGNOSIS — L94.2 CALCINOSIS CUTIS: ICD-10-CM

## 2025-02-06 DIAGNOSIS — S81.801A OPEN WOUND OF RIGHT LOWER LEG, INITIAL ENCOUNTER: Primary | ICD-10-CM

## 2025-02-06 DIAGNOSIS — F31.9 BIPOLAR AFFECTIVE DISORDER, REMISSION STATUS UNSPECIFIED: ICD-10-CM

## 2025-02-06 DIAGNOSIS — E11.628 DIABETES WITH SKIN COMPLICATION: ICD-10-CM

## 2025-02-06 DIAGNOSIS — L91.0 HYPERTROPHIC SCAR OF SKIN: ICD-10-CM

## 2025-02-06 DIAGNOSIS — I10 BENIGN HYPERTENSION: ICD-10-CM

## 2025-02-06 DIAGNOSIS — L90.5 SCAR CONDITION AND FIBROSIS OF SKIN: ICD-10-CM

## 2025-02-06 DIAGNOSIS — I87.331 IDIOPATHIC CHRONIC VENOUS HTN OF RIGHT LEG WITH ULCER AND INFLAMMATION: ICD-10-CM

## 2025-02-06 LAB — POCT GLUCOSE: 197 MG/DL (ref 70–110)

## 2025-02-06 PROCEDURE — 27000999 HC MEDICAL RECORD PHOTO DOCUMENTATION

## 2025-02-06 PROCEDURE — 99212 OFFICE O/P EST SF 10 MIN: CPT | Mod: ,,, | Performed by: EMERGENCY MEDICINE

## 2025-02-06 PROCEDURE — 99212 OFFICE O/P EST SF 10 MIN: CPT

## 2025-02-06 RX ORDER — FLUTICASONE PROPIONATE 50 MCG
1 SPRAY, SUSPENSION (ML) NASAL 2 TIMES DAILY
COMMUNITY
Start: 2025-01-13
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
## 2025-02-06 NOTE — PATIENT INSTRUCTIONS
Pt seen today by: Dr. Thais Wylie    Self care DRESSING INSTRUCTIONS:    Wound location: right lower leg    Cleanse wound with wound cleanser or saline or baby shampoo and water  Apply mupirocin ointment to both wound beds  Cover with bandaid   Change dressing daily    Return visit: Thursday, March 6, 2025 at 10:00 am    Nutrition:  The current daily value (%DV) for protein is 50 grams per day and is meant as a general goal for most people. Further increasing your dietary protein intake is very important for wound healing. Typically one needs over 100g of protein per day to help with wound healing needs.  If you are a dialysis patient or have problems with your kidneys, talk to your Nephrologist about how much protein you can take in with your condition.  Examples of high protein items that can be added to your diet include: eggs, chicken, red meats, almonds, cottage cheese, Greek yogurt, beans, and peanut butter.  Fortified protein bars, shakes and drinks can add 15-30 additional grams of protein per serving.   Also add:   1 daily general multivitamin   Jomar : 1 packet twice daily   Vitamin C : 500mg twice daily   Zinc 220 mg daily  Vit D : once daily    Call our [facility] wound clinic for questions/concerns a [phone]

## 2025-03-06 ENCOUNTER — HOSPITAL ENCOUNTER (OUTPATIENT)
Dept: WOUND CARE | Facility: HOSPITAL | Age: 70
Discharge: HOME | End: 2025-03-06
Attending: EMERGENCY MEDICINE
Payer: MEDICARE

## 2025-03-06 VITALS
HEIGHT: 66 IN | WEIGHT: 170 LBS | HEART RATE: 81 BPM | RESPIRATION RATE: 18 BRPM | TEMPERATURE: 99 F | DIASTOLIC BLOOD PRESSURE: 92 MMHG | SYSTOLIC BLOOD PRESSURE: 163 MMHG | BODY MASS INDEX: 27.32 KG/M2

## 2025-03-06 DIAGNOSIS — L90.5 SCAR CONDITION AND FIBROSIS OF SKIN: ICD-10-CM

## 2025-03-06 DIAGNOSIS — S81.801A OPEN WOUND OF RIGHT LOWER LEG, INITIAL ENCOUNTER: Primary | ICD-10-CM

## 2025-03-06 DIAGNOSIS — E11.628 DIABETES WITH SKIN COMPLICATION: ICD-10-CM

## 2025-03-06 DIAGNOSIS — L91.0 HYPERTROPHIC SCAR OF SKIN: ICD-10-CM

## 2025-03-06 DIAGNOSIS — L94.2 CALCINOSIS CUTIS: ICD-10-CM

## 2025-03-06 DIAGNOSIS — I87.331 IDIOPATHIC CHRONIC VENOUS HTN OF RIGHT LEG WITH ULCER AND INFLAMMATION: ICD-10-CM

## 2025-03-06 DIAGNOSIS — F31.9 BIPOLAR AFFECTIVE DISORDER, REMISSION STATUS UNSPECIFIED: ICD-10-CM

## 2025-03-06 DIAGNOSIS — I10 BENIGN HYPERTENSION: ICD-10-CM

## 2025-03-06 LAB — POCT GLUCOSE: 196 MG/DL (ref 70–110)

## 2025-03-06 PROCEDURE — 99212 OFFICE O/P EST SF 10 MIN: CPT

## 2025-03-06 PROCEDURE — 99212 OFFICE O/P EST SF 10 MIN: CPT | Mod: ,,, | Performed by: EMERGENCY MEDICINE

## 2025-03-06 PROCEDURE — 27000999 HC MEDICAL RECORD PHOTO DOCUMENTATION

## 2025-03-06 NOTE — PATIENT INSTRUCTIONS
Pt seen today by: Dr. Thais Wylie    Self care DRESSING INSTRUCTIONS:    Wound location: right lower leg    Cleanse wound with wound cleanser or saline or baby shampoo and water  Apply mupirocin ointment to both wound beds  Cover with bandaid   Change dressing daily    Return visit: Thursday, April 17, 2025 at 10:00 am    Nutrition:  The current daily value (%DV) for protein is 50 grams per day and is meant as a general goal for most people. Further increasing your dietary protein intake is very important for wound healing. Typically one needs over 100g of protein per day to help with wound healing needs.  If you are a dialysis patient or have problems with your kidneys, talk to your Nephrologist about how much protein you can take in with your condition.  Examples of high protein items that can be added to your diet include: eggs, chicken, red meats, almonds, cottage cheese, Greek yogurt, beans, and peanut butter.  Fortified protein bars, shakes and drinks can add 15-30 additional grams of protein per serving.   Also add:   1 daily general multivitamin   Jomar : 1 packet twice daily   Vitamin C : 500mg twice daily   Zinc 220 mg daily  Vit D : once daily    Call our [facility] wound clinic for questions/concerns a [phone]
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
## 2025-04-08 ENCOUNTER — HOSPITAL ENCOUNTER (OUTPATIENT)
Dept: RADIOLOGY | Facility: HOSPITAL | Age: 70
Discharge: HOME | End: 2025-04-08
Attending: NURSE PRACTITIONER
Payer: MEDICARE

## 2025-04-08 DIAGNOSIS — Z12.31 ENCOUNTER FOR SCREENING MAMMOGRAM FOR BREAST CANCER: ICD-10-CM

## 2025-04-08 PROCEDURE — 77067 SCR MAMMO BI INCL CAD: CPT | Mod: TC

## 2025-04-08 PROCEDURE — 77063 BREAST TOMOSYNTHESIS BI: CPT | Mod: 26,,, | Performed by: STUDENT IN AN ORGANIZED HEALTH CARE EDUCATION/TRAINING PROGRAM

## 2025-04-08 PROCEDURE — 77067 SCR MAMMO BI INCL CAD: CPT | Mod: 26,,, | Performed by: STUDENT IN AN ORGANIZED HEALTH CARE EDUCATION/TRAINING PROGRAM

## 2025-04-17 ENCOUNTER — HOSPITAL ENCOUNTER (OUTPATIENT)
Dept: WOUND CARE | Facility: HOSPITAL | Age: 70
Discharge: HOME | End: 2025-04-17
Attending: EMERGENCY MEDICINE
Payer: MEDICARE

## 2025-04-17 VITALS
RESPIRATION RATE: 18 BRPM | TEMPERATURE: 98 F | WEIGHT: 170 LBS | BODY MASS INDEX: 27.32 KG/M2 | HEART RATE: 79 BPM | SYSTOLIC BLOOD PRESSURE: 151 MMHG | HEIGHT: 66 IN | DIASTOLIC BLOOD PRESSURE: 84 MMHG

## 2025-04-17 DIAGNOSIS — I10 BENIGN HYPERTENSION: ICD-10-CM

## 2025-04-17 DIAGNOSIS — I87.331 IDIOPATHIC CHRONIC VENOUS HTN OF RIGHT LEG WITH ULCER AND INFLAMMATION: ICD-10-CM

## 2025-04-17 DIAGNOSIS — L90.5 SCAR CONDITION AND FIBROSIS OF SKIN: ICD-10-CM

## 2025-04-17 DIAGNOSIS — L91.0 HYPERTROPHIC SCAR OF SKIN: ICD-10-CM

## 2025-04-17 DIAGNOSIS — S81.801A OPEN WOUND OF RIGHT LOWER LEG, INITIAL ENCOUNTER: Primary | ICD-10-CM

## 2025-04-17 DIAGNOSIS — E11.628 DIABETES WITH SKIN COMPLICATION: ICD-10-CM

## 2025-04-17 DIAGNOSIS — L94.2 CALCINOSIS CUTIS: ICD-10-CM

## 2025-04-17 LAB — POCT GLUCOSE: 217 MG/DL (ref 70–110)

## 2025-04-17 PROCEDURE — 99212 OFFICE O/P EST SF 10 MIN: CPT | Mod: ,,, | Performed by: EMERGENCY MEDICINE

## 2025-04-17 PROCEDURE — 99212 OFFICE O/P EST SF 10 MIN: CPT

## 2025-04-17 PROCEDURE — 27000999 HC MEDICAL RECORD PHOTO DOCUMENTATION
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
## 2025-04-17 NOTE — PATIENT INSTRUCTIONS
Pt seen today by: Dr. Thais Wylie    When you see Dr. Sloan ask them to print your labwork and bring it on your next visit    Self care DRESSING INSTRUCTIONS:    Wound location: right lower leg    Cleanse wound with wound cleanser or saline or baby shampoo and water  Apply mupirocin ointment to both wound beds  Cover with bandaid   Change dressing daily    Return visit: Thursday, May 29, 2025 at 10:00 am    Nutrition:  The current daily value (%DV) for protein is 50 grams per day and is meant as a general goal for most people. Further increasing your dietary protein intake is very important for wound healing. Typically one needs over 100g of protein per day to help with wound healing needs.  If you are a dialysis patient or have problems with your kidneys, talk to your Nephrologist about how much protein you can take in with your condition.  Examples of high protein items that can be added to your diet include: eggs, chicken, red meats, almonds, cottage cheese, Greek yogurt, beans, and peanut butter.  Fortified protein bars, shakes and drinks can add 15-30 additional grams of protein per serving.   Also add:   1 daily general multivitamin   Jomar : 1 packet twice daily   Vitamin C : 500mg twice daily   Zinc 220 mg daily  Vit D : once daily    Call our [facility] wound clinic for questions/concerns a [phone]

## 2025-05-23 ENCOUNTER — LAB VISIT (OUTPATIENT)
Dept: LAB | Facility: HOSPITAL | Age: 70
End: 2025-05-23
Attending: INTERNAL MEDICINE
Payer: MEDICARE

## 2025-05-23 DIAGNOSIS — E11.9 DIABETES MELLITUS WITHOUT COMPLICATION: ICD-10-CM

## 2025-05-23 DIAGNOSIS — E78.5 HYPERLIPIDEMIA, UNSPECIFIED HYPERLIPIDEMIA TYPE: ICD-10-CM

## 2025-05-23 DIAGNOSIS — E55.9 AVITAMINOSIS D: ICD-10-CM

## 2025-05-23 DIAGNOSIS — I10 ESSENTIAL HYPERTENSION, MALIGNANT: Primary | ICD-10-CM

## 2025-05-23 LAB
25(OH)D3+25(OH)D2 SERPL-MCNC: 87 NG/ML (ref 30–80)
ALBUMIN SERPL-MCNC: 4 G/DL (ref 3.4–4.8)
ALBUMIN/GLOB SERPL: 0.9 RATIO (ref 1.1–2)
ALP SERPL-CCNC: 113 UNIT/L (ref 40–150)
ALT SERPL-CCNC: 28 UNIT/L (ref 0–55)
ANION GAP SERPL CALC-SCNC: 12 MEQ/L
AST SERPL-CCNC: 22 UNIT/L (ref 11–45)
BILIRUB SERPL-MCNC: 0.4 MG/DL
BUN SERPL-MCNC: 11.8 MG/DL (ref 9.8–20.1)
CALCIUM SERPL-MCNC: 9.7 MG/DL (ref 8.4–10.2)
CHLORIDE SERPL-SCNC: 106 MMOL/L (ref 98–107)
CHOLEST SERPL-MCNC: 119 MG/DL
CHOLEST/HDLC SERPL: 3 {RATIO} (ref 0–5)
CO2 SERPL-SCNC: 20 MMOL/L (ref 23–31)
CREAT SERPL-MCNC: 0.78 MG/DL (ref 0.55–1.02)
CREAT/UREA NIT SERPL: 15
ERYTHROCYTE [DISTWIDTH] IN BLOOD BY AUTOMATED COUNT: 13.2 % (ref 11.5–17)
EST. AVERAGE GLUCOSE BLD GHB EST-MCNC: 226 MG/DL
GFR SERPLBLD CREATININE-BSD FMLA CKD-EPI: >60 ML/MIN/1.73/M2
GLOBULIN SER-MCNC: 4.4 GM/DL (ref 2.4–3.5)
GLUCOSE SERPL-MCNC: 220 MG/DL (ref 82–115)
HBA1C MFR BLD: 9.5 %
HCT VFR BLD AUTO: 44.2 % (ref 37–47)
HDLC SERPL-MCNC: 38 MG/DL (ref 35–60)
HGB BLD-MCNC: 14.3 G/DL (ref 12–16)
LDLC SERPL CALC-MCNC: 53 MG/DL (ref 50–140)
MCH RBC QN AUTO: 27.2 PG (ref 27–31)
MCHC RBC AUTO-ENTMCNC: 32.4 G/DL (ref 33–36)
MCV RBC AUTO: 84 FL (ref 80–94)
NRBC BLD AUTO-RTO: 0 %
PLATELET # BLD AUTO: 254 X10(3)/MCL (ref 130–400)
PMV BLD AUTO: 10.2 FL (ref 7.4–10.4)
POTASSIUM SERPL-SCNC: 4.5 MMOL/L (ref 3.5–5.1)
PROT SERPL-MCNC: 8.4 GM/DL (ref 5.8–7.6)
RBC # BLD AUTO: 5.26 X10(6)/MCL (ref 4.2–5.4)
SODIUM SERPL-SCNC: 138 MMOL/L (ref 136–145)
T4 FREE SERPL-MCNC: 0.87 NG/DL (ref 0.7–1.48)
TRIGL SERPL-MCNC: 142 MG/DL (ref 37–140)
TSH SERPL-ACNC: 1.22 UIU/ML (ref 0.35–4.94)
VLDLC SERPL CALC-MCNC: 28 MG/DL
WBC # BLD AUTO: 8.12 X10(3)/MCL (ref 4.5–11.5)

## 2025-05-23 PROCEDURE — 84439 ASSAY OF FREE THYROXINE: CPT

## 2025-05-23 PROCEDURE — 80061 LIPID PANEL: CPT

## 2025-05-23 PROCEDURE — 84443 ASSAY THYROID STIM HORMONE: CPT

## 2025-05-23 PROCEDURE — 82306 VITAMIN D 25 HYDROXY: CPT

## 2025-05-23 PROCEDURE — 85027 COMPLETE CBC AUTOMATED: CPT

## 2025-05-23 PROCEDURE — 36415 COLL VENOUS BLD VENIPUNCTURE: CPT

## 2025-05-23 PROCEDURE — 83036 HEMOGLOBIN GLYCOSYLATED A1C: CPT

## 2025-05-23 PROCEDURE — 80053 COMPREHEN METABOLIC PANEL: CPT

## 2025-05-29 ENCOUNTER — HOSPITAL ENCOUNTER (OUTPATIENT)
Dept: WOUND CARE | Facility: HOSPITAL | Age: 70
Discharge: HOME | End: 2025-05-29
Attending: EMERGENCY MEDICINE
Payer: MEDICARE

## 2025-05-29 VITALS
BODY MASS INDEX: 27.16 KG/M2 | HEART RATE: 79 BPM | RESPIRATION RATE: 16 BRPM | DIASTOLIC BLOOD PRESSURE: 74 MMHG | TEMPERATURE: 99 F | HEIGHT: 66 IN | WEIGHT: 169 LBS | SYSTOLIC BLOOD PRESSURE: 121 MMHG

## 2025-05-29 DIAGNOSIS — I87.331 IDIOPATHIC CHRONIC VENOUS HTN OF RIGHT LEG WITH ULCER AND INFLAMMATION: ICD-10-CM

## 2025-05-29 DIAGNOSIS — L90.5 SCAR CONDITION AND FIBROSIS OF SKIN: ICD-10-CM

## 2025-05-29 DIAGNOSIS — S81.801A OPEN WOUND OF RIGHT LOWER LEG, INITIAL ENCOUNTER: Primary | ICD-10-CM

## 2025-05-29 DIAGNOSIS — L94.2 CALCINOSIS CUTIS: ICD-10-CM

## 2025-05-29 DIAGNOSIS — E11.628 DIABETES WITH SKIN COMPLICATION: ICD-10-CM

## 2025-05-29 DIAGNOSIS — I10 BENIGN HYPERTENSION: ICD-10-CM

## 2025-05-29 DIAGNOSIS — L91.0 HYPERTROPHIC SCAR OF SKIN: ICD-10-CM

## 2025-05-29 DIAGNOSIS — F31.9 BIPOLAR AFFECTIVE DISORDER, REMISSION STATUS UNSPECIFIED: ICD-10-CM

## 2025-05-29 LAB — POCT GLUCOSE: 216 MG/DL (ref 70–110)

## 2025-05-29 PROCEDURE — 99212 OFFICE O/P EST SF 10 MIN: CPT

## 2025-05-29 PROCEDURE — 27000999 HC MEDICAL RECORD PHOTO DOCUMENTATION

## 2025-05-29 PROCEDURE — 99212 OFFICE O/P EST SF 10 MIN: CPT | Mod: ,,, | Performed by: EMERGENCY MEDICINE
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
## 2025-05-29 NOTE — PATIENT INSTRUCTIONS
Pt seen today by: Dr. Thais Wylie      Self care DRESSING INSTRUCTIONS:    Wound location: right lower leg    Cleanse wound with wound cleanser or saline or baby shampoo and water  Apply mupirocin ointment to both wound beds  Cover with bandaid   Change dressing daily    Return visit: Thursday, June 26th, 2025 at 10:00 am    Nutrition:  The current daily value (%DV) for protein is 50 grams per day and is meant as a general goal for most people. Further increasing your dietary protein intake is very important for wound healing. Typically one needs over 100g of protein per day to help with wound healing needs.  If you are a dialysis patient or have problems with your kidneys, talk to your Nephrologist about how much protein you can take in with your condition.  Examples of high protein items that can be added to your diet include: eggs, chicken, red meats, almonds, cottage cheese, Greek yogurt, beans, and peanut butter.  Fortified protein bars, shakes and drinks can add 15-30 additional grams of protein per serving.   Also add:   1 daily general multivitamin   Jomar : 1 packet twice daily   Vitamin C : 500mg twice daily   Zinc 220 mg daily  Vit D : once daily    Call our [facility] wound clinic for questions/concerns a [phone]

## 2025-06-26 ENCOUNTER — HOSPITAL ENCOUNTER (OUTPATIENT)
Dept: WOUND CARE | Facility: HOSPITAL | Age: 70
Discharge: HOME | End: 2025-06-26
Attending: EMERGENCY MEDICINE
Payer: MEDICARE

## 2025-06-26 VITALS
RESPIRATION RATE: 18 BRPM | HEART RATE: 80 BPM | HEIGHT: 66 IN | TEMPERATURE: 98 F | WEIGHT: 169 LBS | BODY MASS INDEX: 27.16 KG/M2 | DIASTOLIC BLOOD PRESSURE: 80 MMHG | SYSTOLIC BLOOD PRESSURE: 128 MMHG

## 2025-06-26 PROCEDURE — 99213 OFFICE O/P EST LOW 20 MIN: CPT

## 2025-06-26 PROCEDURE — 27000999 HC MEDICAL RECORD PHOTO DOCUMENTATION
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
## 2025-06-26 NOTE — PATIENT INSTRUCTIONS
Pt seen today by: Dr. Thais Wylie      Self care DRESSING INSTRUCTIONS:    Wound location: right lower leg    Cleanse wound with wound cleanser or saline or baby shampoo and water  Apply mupirocin ointment to both wound beds  Cover with bandaid   Change dressing daily    Return visit: Thursday, July 24th, 2025 at 10:00 am    Nutrition:  The current daily value (%DV) for protein is 50 grams per day and is meant as a general goal for most people. Further increasing your dietary protein intake is very important for wound healing. Typically one needs over 100g of protein per day to help with wound healing needs.  If you are a dialysis patient or have problems with your kidneys, talk to your Nephrologist about how much protein you can take in with your condition.  Examples of high protein items that can be added to your diet include: eggs, chicken, red meats, almonds, cottage cheese, Greek yogurt, beans, and peanut butter.  Fortified protein bars, shakes and drinks can add 15-30 additional grams of protein per serving.   Also add:   1 daily general multivitamin   Jomar : 1 packet twice daily   Vitamin C : 500mg twice daily   Zinc 220 mg daily  Vit D : once daily    Call our [facility] wound clinic for questions/concerns a [phone]

## 2025-07-24 ENCOUNTER — HOSPITAL ENCOUNTER (OUTPATIENT)
Dept: WOUND CARE | Facility: HOSPITAL | Age: 70
Discharge: HOME | End: 2025-07-24
Attending: EMERGENCY MEDICINE
Payer: MEDICARE

## 2025-07-24 VITALS
BODY MASS INDEX: 27.17 KG/M2 | DIASTOLIC BLOOD PRESSURE: 73 MMHG | SYSTOLIC BLOOD PRESSURE: 169 MMHG | TEMPERATURE: 100 F | WEIGHT: 169.06 LBS | RESPIRATION RATE: 18 BRPM | HEART RATE: 86 BPM | HEIGHT: 66 IN

## 2025-07-24 DIAGNOSIS — I87.331 IDIOPATHIC CHRONIC VENOUS HTN OF RIGHT LEG WITH ULCER AND INFLAMMATION: ICD-10-CM

## 2025-07-24 DIAGNOSIS — L90.5 SCAR CONDITION AND FIBROSIS OF SKIN: ICD-10-CM

## 2025-07-24 DIAGNOSIS — E11.628 DIABETES WITH SKIN COMPLICATION: ICD-10-CM

## 2025-07-24 DIAGNOSIS — I10 BENIGN HYPERTENSION: ICD-10-CM

## 2025-07-24 DIAGNOSIS — L91.0 HYPERTROPHIC SCAR OF SKIN: ICD-10-CM

## 2025-07-24 DIAGNOSIS — F31.9 BIPOLAR AFFECTIVE DISORDER, REMISSION STATUS UNSPECIFIED: ICD-10-CM

## 2025-07-24 DIAGNOSIS — S81.801A OPEN WOUND OF RIGHT LOWER LEG, INITIAL ENCOUNTER: Primary | ICD-10-CM

## 2025-07-24 DIAGNOSIS — L94.2 CALCINOSIS CUTIS: ICD-10-CM

## 2025-07-24 LAB — POCT GLUCOSE: 190 MG/DL (ref 70–110)

## 2025-07-24 PROCEDURE — 99213 OFFICE O/P EST LOW 20 MIN: CPT

## 2025-07-24 PROCEDURE — 99213 OFFICE O/P EST LOW 20 MIN: CPT | Mod: ,,, | Performed by: EMERGENCY MEDICINE

## 2025-07-24 PROCEDURE — 27000999 HC MEDICAL RECORD PHOTO DOCUMENTATION

## 2025-07-24 NOTE — PATIENT INSTRUCTIONS
Pt seen today by: Dr. Thais Wylie      Self care DRESSING INSTRUCTIONS:    Wound location: right lower leg    Cleanse wound with wound cleanser or saline or baby shampoo and water  Apply mupirocin ointment to both wound beds  Cover with bandaid   Change dressing daily    Return visit: Thursday, August 21th, 2025 at 10:00 am    Nutrition:  The current daily value (%DV) for protein is 50 grams per day and is meant as a general goal for most people. Further increasing your dietary protein intake is very important for wound healing. Typically one needs over 100g of protein per day to help with wound healing needs.  If you are a dialysis patient or have problems with your kidneys, talk to your Nephrologist about how much protein you can take in with your condition.  Examples of high protein items that can be added to your diet include: eggs, chicken, red meats, almonds, cottage cheese, Greek yogurt, beans, and peanut butter.  Fortified protein bars, shakes and drinks can add 15-30 additional grams of protein per serving.   Also add:   1 daily general multivitamin   Jomar : 1 packet twice daily   Vitamin C : 500mg twice daily   Zinc 220 mg daily  Vit D : once daily    Call our [facility] wound clinic for questions/concerns a [phone]
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
## 2025-08-11 PROBLEM — E11.628 DIABETES WITH SKIN COMPLICATION: Status: RESOLVED | Noted: 2024-12-05 | Resolved: 2025-08-11

## 2025-08-15 ENCOUNTER — HOSPITAL ENCOUNTER (EMERGENCY)
Facility: HOSPITAL | Age: 70
Discharge: HOME | End: 2025-08-15
Attending: EMERGENCY MEDICINE
Payer: MEDICARE

## 2025-08-15 VITALS
DIASTOLIC BLOOD PRESSURE: 100 MMHG | TEMPERATURE: 98 F | SYSTOLIC BLOOD PRESSURE: 174 MMHG | RESPIRATION RATE: 24 BRPM | HEART RATE: 77 BPM | WEIGHT: 168 LBS | BODY MASS INDEX: 27 KG/M2 | HEIGHT: 66 IN | OXYGEN SATURATION: 99 %

## 2025-08-15 DIAGNOSIS — T78.1XXA ALLERGIC REACTION TO FOOD, INITIAL ENCOUNTER: Primary | ICD-10-CM

## 2025-08-15 PROCEDURE — 96374 THER/PROPH/DIAG INJ IV PUSH: CPT

## 2025-08-15 PROCEDURE — 96375 TX/PRO/DX INJ NEW DRUG ADDON: CPT

## 2025-08-15 PROCEDURE — 25000003 PHARM REV CODE 250

## 2025-08-15 PROCEDURE — 63600175 PHARM REV CODE 636 W HCPCS

## 2025-08-15 PROCEDURE — 99284 EMERGENCY DEPT VISIT MOD MDM: CPT

## 2025-08-15 RX ORDER — FAMOTIDINE 10 MG/ML
20 INJECTION, SOLUTION INTRAVENOUS
Status: COMPLETED | OUTPATIENT
Start: 2025-08-15 | End: 2025-08-15

## 2025-08-15 RX ORDER — DEXAMETHASONE SODIUM PHOSPHATE 4 MG/ML
8 INJECTION, SOLUTION INTRA-ARTICULAR; INTRALESIONAL; INTRAMUSCULAR; INTRAVENOUS; SOFT TISSUE
Status: COMPLETED | OUTPATIENT
Start: 2025-08-15 | End: 2025-08-15

## 2025-08-15 RX ORDER — SODIUM CHLORIDE 9 MG/ML
1000 INJECTION, SOLUTION INTRAVENOUS
Status: COMPLETED | OUTPATIENT
Start: 2025-08-15 | End: 2025-08-15

## 2025-08-15 RX ORDER — DIPHENHYDRAMINE HYDROCHLORIDE 50 MG/ML
25 INJECTION, SOLUTION INTRAMUSCULAR; INTRAVENOUS
Status: COMPLETED | OUTPATIENT
Start: 2025-08-15 | End: 2025-08-15

## 2025-08-15 RX ADMIN — DEXAMETHASONE SODIUM PHOSPHATE 8 MG: 4 INJECTION, SOLUTION INTRA-ARTICULAR; INTRALESIONAL; INTRAMUSCULAR; INTRAVENOUS; SOFT TISSUE at 09:08

## 2025-08-15 RX ADMIN — FAMOTIDINE 20 MG: 10 INJECTION, SOLUTION INTRAVENOUS at 09:08

## 2025-08-15 RX ADMIN — DIPHENHYDRAMINE HYDROCHLORIDE 25 MG: 50 INJECTION INTRAMUSCULAR; INTRAVENOUS at 09:08

## 2025-08-15 RX ADMIN — SODIUM CHLORIDE 1000 ML: 9 INJECTION, SOLUTION INTRAVENOUS at 09:08

## 2025-08-21 ENCOUNTER — HOSPITAL ENCOUNTER (OUTPATIENT)
Dept: WOUND CARE | Facility: HOSPITAL | Age: 70
Discharge: HOME | End: 2025-08-21
Attending: EMERGENCY MEDICINE
Payer: MEDICARE

## 2025-08-21 VITALS
HEART RATE: 79 BPM | BODY MASS INDEX: 27 KG/M2 | TEMPERATURE: 99 F | DIASTOLIC BLOOD PRESSURE: 97 MMHG | RESPIRATION RATE: 18 BRPM | HEIGHT: 66 IN | WEIGHT: 168 LBS | SYSTOLIC BLOOD PRESSURE: 172 MMHG

## 2025-08-21 DIAGNOSIS — F31.9 BIPOLAR AFFECTIVE DISORDER, REMISSION STATUS UNSPECIFIED: ICD-10-CM

## 2025-08-21 DIAGNOSIS — L94.2 CALCINOSIS CUTIS: ICD-10-CM

## 2025-08-21 DIAGNOSIS — L90.5 SCAR CONDITION AND FIBROSIS OF SKIN: ICD-10-CM

## 2025-08-21 DIAGNOSIS — E11.628 DIABETES WITH SKIN COMPLICATION: ICD-10-CM

## 2025-08-21 DIAGNOSIS — S81.801A OPEN WOUND OF RIGHT LOWER LEG, INITIAL ENCOUNTER: Primary | ICD-10-CM

## 2025-08-21 DIAGNOSIS — L91.0 HYPERTROPHIC SCAR OF SKIN: ICD-10-CM

## 2025-08-21 DIAGNOSIS — I10 BENIGN HYPERTENSION: ICD-10-CM

## 2025-08-21 LAB — POCT GLUCOSE: 164 MG/DL (ref 70–110)

## 2025-08-21 PROCEDURE — 99212 OFFICE O/P EST SF 10 MIN: CPT | Mod: ,,, | Performed by: EMERGENCY MEDICINE

## 2025-08-21 PROCEDURE — 99212 OFFICE O/P EST SF 10 MIN: CPT

## 2025-08-21 PROCEDURE — 27000999 HC MEDICAL RECORD PHOTO DOCUMENTATION

## 2025-08-21 RX ORDER — NAPROXEN 500 MG/1
500 TABLET ORAL 2 TIMES DAILY
COMMUNITY
Start: 2025-08-12